# Patient Record
Sex: FEMALE | Race: WHITE | Employment: FULL TIME | URBAN - METROPOLITAN AREA
[De-identification: names, ages, dates, MRNs, and addresses within clinical notes are randomized per-mention and may not be internally consistent; named-entity substitution may affect disease eponyms.]

---

## 2017-09-01 ENCOUNTER — OFFICE VISIT (OUTPATIENT)
Dept: FAMILY MEDICINE CLINIC | Age: 32
End: 2017-09-01

## 2017-09-01 VITALS
DIASTOLIC BLOOD PRESSURE: 78 MMHG | OXYGEN SATURATION: 98 % | RESPIRATION RATE: 16 BRPM | TEMPERATURE: 98.8 F | SYSTOLIC BLOOD PRESSURE: 108 MMHG | BODY MASS INDEX: 28.02 KG/M2 | HEIGHT: 63 IN | WEIGHT: 158.13 LBS | HEART RATE: 77 BPM

## 2017-09-01 DIAGNOSIS — K62.5 RECTAL BLEEDING: Primary | ICD-10-CM

## 2017-09-01 NOTE — MR AVS SNAPSHOT
Visit Information Date & Time Provider Department Dept. Phone Encounter #  
 9/1/2017  3:30 PM [facility], [address] [phone] [id number] Upcoming Health Maintenance Date Due Pneumococcal 19-64 Medium Risk (1 of 1 - PPSV23) 10/17/2005 DTaP/Tdap/Td series (1 - Tdap) 10/17/2007 PAP AKA CERVICAL CYTOLOGY 10/17/2007 INFLUENZA AGE 9 TO ADULT 8/1/2017 Allergies as of 9/1/2017  Review Complete On: 9/1/2017 By: Ariadne Newton LPN Severity Noted Reaction Type Reactions Melon High 02/26/2016    Anaphylaxis Nsaids (Non-steroidal Anti-inflammatory Drug) High 02/26/2016    Anaphylaxis Other Medication  02/26/2016    Swelling Allergic to steroids Singulair [Montelukast]  03/28/2016   Side Effect Other (comments) Numbness in arms and face, drowsiness Current Immunizations  Reviewed on 2/26/2016 No immunizations on file. Not reviewed this visit You Were Diagnosed With   
  
 Codes Comments Rectal bleeding    -  Primary ICD-10-CM: K62.5 ICD-9-CM: 569.3 Vitals BP Pulse Temp Resp Height(growth percentile) Weight(growth percentile) 108/78 77 98.8 °F (37.1 °C) (Oral) 16 5' 3\" (1.6 m) 158 lb 2 oz (71.7 kg) LMP SpO2 BMI OB Status Smoking Status 08/11/2017 (Approximate) 98% 28.01 kg/m2 Having regular periods Former Smoker Vitals History BMI and BSA Data Body Mass Index Body Surface Area 28.01 kg/m 2 1.79 m 2 Preferred Pharmacy Pharmacy Name Phone [id number], [address] [phone] Your Updated Medication List  
  
   
This list is accurate as of: 9/1/17  4:21 PM.  Always use your most recent med list.  
  
  
  
  
 albuterol 90 mcg/actuation inhaler Commonly known as:  PROVENTIL HFA, VENTOLIN HFA, PROAIR HFA Take 2 Puffs by inhalation every four (4) hours as needed for Wheezing.  
  
 escitalopram oxalate 10 mg tablet Commonly known as:  Vallorie Primmer TAKE 1 TABLET BY MOUTH EVERY DAY  
  
 montelukast 10 mg tablet Commonly known as:  SINGULAIR Take 1 Tab by mouth daily. We Performed the Following REFERRAL TO COLON AND RECTAL SURGERY [REF17 Custom] Comments:  
 Please evaluate patient for rectal bleeding. Referral Information Referral ID Referred By Referred To  
  
 [id number] [facility] [address] [facility] [place] [address] [place], [address] Visits Status Start Date End Date 1 New Request 9/1/17 9/1/18 If your referral has a status of pending review or denied, additional information will be sent to support the outcome of this decision. Introducing \[facility]\"" & HEALTH SERVICES! Dear Russell Sun: Thank you for requesting a [employer] account. Our records indicate that you already have an active [employer] account. You can access your account anytime at [email or web address] Did you know that you can access your hospital and ER discharge instructions at any time in [employer]? You can also review all of your test results from your hospital stay or ER visit. Additional Information If you have questions, please visit the Frequently Asked Questions section of the [employer] website at [email or web address] Remember, [employer] is NOT to be used for urgent needs. For medical emergencies, dial 911. Now available from your iPhone and Android! Please provide this summary of care documentation to your next provider. If you have any questions after today's visit, please call [phone].

## 2017-09-01 NOTE — PROGRESS NOTES
1. Have you been to the ER, urgent care clinic since your last visit? Hospitalized since your last visit? No    2. Have you seen or consulted any other health care providers outside of the [address] [place] [address] since your last visit? Include any pap smears or colon screening. No    Chief Complaint   Patient presents with    Cold Symptoms     diarrhea, cough & body aches x 3    Melena     x 1 wk, does have hx with hemorrhoids     Pt states she has diarrhea, cough & body aches x 3 days. She also reports blood in stool x 1 wk. She has a hx of hemorrhoids.

## 2017-09-01 NOTE — PROGRESS NOTES
HISTORY OF PRESENT ILLNESS  Bronson Mata is a 28 y.o. female. HPI  Having body aches, chills, cough, headache, sweats  Sx x 3 days  Nausea and some diarrhea   Unknown trigger    She is having rectal bleeding with bowel movement  Has pmh of hemorrhoids however never saw specialist  Worse since having some diarrhea a month ago  Denies pain or cramping    ROS  A comprehensive review of system was obtained and negative except findings in the HPI    Visit Vitals    /78    Pulse 77    Temp 98.8 °F (37.1 °C) (Oral)    Resp 16    Ht 5' 3\" (1.6 m)    Wt 158 lb 2 oz (71.7 kg)    LMP 08/11/2017 (Approximate)    SpO2 98%    BMI 28.01 kg/m2     Physical Exam   Constitutional: She is oriented to person, place, and time. She appears well-developed and well-nourished. Neck: No JVD present. Cardiovascular: Normal rate, regular rhythm and intact distal pulses. Exam reveals no gallop and no friction rub. No murmur heard. Pulmonary/Chest: Effort normal and breath sounds normal. No respiratory distress. She has no wheezes. Musculoskeletal: She exhibits no edema. Neurological: She is alert and oriented to person, place, and time. Skin: Skin is warm. Nursing note and vitals reviewed. ASSESSMENT and PLAN  Encounter Diagnoses   Name Primary?  Rectal bleeding Yes     Orders Placed This Encounter    REFERRAL TO COLON AND RECTAL SURGERY     Given referral to colo rectal surgeon for eval  Reviewed viral syndrome management, tylenol cold/flu, push fluids    I have discussed the diagnosis with the patient and the intended plan as seen in the above orders. The patient has received an after-visit summary and questions were answered concerning future plans. Patient conveyed understanding of the plan at the time of the visit.     Lisa Wilkins, MSN, ANP  9/1/2017

## 2017-12-07 ENCOUNTER — OFFICE VISIT (OUTPATIENT)
Dept: FAMILY MEDICINE CLINIC | Age: 32
End: 2017-12-07

## 2017-12-07 VITALS
SYSTOLIC BLOOD PRESSURE: 115 MMHG | HEIGHT: 63 IN | BODY MASS INDEX: 28.76 KG/M2 | DIASTOLIC BLOOD PRESSURE: 84 MMHG | RESPIRATION RATE: 18 BRPM | OXYGEN SATURATION: 100 % | WEIGHT: 162.31 LBS | HEART RATE: 86 BPM | TEMPERATURE: 98.5 F

## 2017-12-07 DIAGNOSIS — R51.9 CHRONIC NONINTRACTABLE HEADACHE, UNSPECIFIED HEADACHE TYPE: Primary | ICD-10-CM

## 2017-12-07 DIAGNOSIS — G89.29 CHRONIC NONINTRACTABLE HEADACHE, UNSPECIFIED HEADACHE TYPE: Primary | ICD-10-CM

## 2017-12-07 DIAGNOSIS — R42 DIZZINESS: ICD-10-CM

## 2017-12-07 DIAGNOSIS — R68.89 COLD INTOLERANCE: ICD-10-CM

## 2017-12-07 DIAGNOSIS — K29.70 GASTRITIS WITHOUT BLEEDING, UNSPECIFIED CHRONICITY, UNSPECIFIED GASTRITIS TYPE: ICD-10-CM

## 2017-12-07 DIAGNOSIS — M54.2 CHRONIC NECK PAIN: ICD-10-CM

## 2017-12-07 DIAGNOSIS — G89.29 CHRONIC NECK PAIN: ICD-10-CM

## 2017-12-07 RX ORDER — OMEPRAZOLE 20 MG/1
20 CAPSULE, DELAYED RELEASE ORAL DAILY
Qty: 30 CAP | Refills: 0 | Status: SHIPPED | OUTPATIENT
Start: 2017-12-07

## 2017-12-07 RX ORDER — CYCLOBENZAPRINE HCL 10 MG
10 TABLET ORAL
Qty: 30 TAB | Refills: 1 | Status: SHIPPED | OUTPATIENT
Start: 2017-12-07

## 2017-12-07 NOTE — PROGRESS NOTES
Chief Complaint   Patient presents with   Radha Leblanc     got massage yesterday- had nauesa , dizziness, spinning, hot/cold spells     she is a 33y.o. year old female who presents for evalution. Pt has massage yesterday of back and neck. Had muscle spasm in back which is why pt went. During neck massage pt was having pain, then turned into cold sweat. Pt states then started having dizziness and nausea. Told massage therapist and was recommended pt come in today. Pt states has long stand issues with neck pain. Pt states has headaches 4-5 times weekly, normal for pt since she was a child but has been worsening. Constantly feels cold. Pt states whenever lays down will have burning sensation in stomach. At same time will also feels some skin itching or crawling sensation in extremities. Reviewed PmHx, RxHx, FmHx, SocHx, AllgHx and updated and dated in the chart. Review of Systems - negative except as listed above in the HPI    Objective:     Vitals:    12/07/17 1501   BP: 115/84   Pulse: 86   Resp: 18   Temp: 98.5 °F (36.9 °C)   TempSrc: Oral   SpO2: 100%   Weight: 162 lb 5 oz (73.6 kg)   Height: 5' 3\" (1.6 m)     Physical Examination: General appearance - alert, well appearing, and in no distress  Neck - supple, no significant adenopathy, bilateral trap tightness, generalized tenderness, movements painful   Chest - clear to auscultation, no wheezes, rales or rhonchi, symmetric air entry  Heart - normal rate, regular rhythm, normal S1, S2, no murmurs, rubs, clicks or gallops  Abdomen - soft, nontender, nondistended, no masses or organomegaly  Neurological - alert, oriented, normal speech, no focal findings or movement disorder noted, cranial nerves II through XII intact, motor and sensory grossly normal bilaterally  Neg Melrose Hallpike test     Assessment/ Plan:   Diagnoses and all orders for this visit:    1.  Chronic nonintractable headache, unspecified headache type  -     REFERRAL TO NEUROLOGY  -     CBC WITH AUTOMATED DIFF  -     TSH 3RD GENERATION  -     METABOLIC PANEL, COMPREHENSIVE  Discussed Topamax but pt would like to see specialist first.     2. Cold intolerance  -     TSH 3RD GENERATION  Unclear etiology, labs pending. 3. Dizziness  -     CBC WITH AUTOMATED DIFF  -     TSH 3RD GENERATION  -     METABOLIC PANEL, COMPREHENSIVE  Unclear etiology, labs pending. 4. Chronic neck pain  -     cyclobenzaprine (FLEXERIL) 10 mg tablet; Take 1 Tab by mouth nightly as needed for Muscle Spasm(s). New rx. Activity modification, application of heat or ice. 5. Gastritis without bleeding, unspecified chronicity, unspecified gastritis type  -     omeprazole (PRILOSEC) 20 mg capsule; Take 1 Cap by mouth daily. Take with water in AM, wait 30-60 min before eating. New rx. GERD diet, advance as tolerated. Pt voiced understanding regarding plan of care. Follow-up Disposition:  Return if symptoms worsen or fail to improve. I have discussed the diagnosis with the patient and the intended plan as seen in the above orders. The patient has received an after-visit summary and questions were answered concerning future plans.      Medication Side Effects and Warnings were discussed with patient    Araceli Blizzard, NP

## 2017-12-07 NOTE — MR AVS SNAPSHOT
Visit Information Date & Time Provider Department Dept. Phone Encounter #  
 12/7/2017  3:00 PM Katelynn Oakley NP [address] [facility] [phone] [id number] Follow-up Instructions Return if symptoms worsen or fail to improve. Upcoming Health Maintenance Date Due Pneumococcal 19-64 Medium Risk (1 of 1 - PPSV23) 10/17/2005 DTaP/Tdap/Td series (1 - Tdap) 10/17/2007 PAP AKA CERVICAL CYTOLOGY 10/17/2007 Influenza Age 5 to Adult 8/1/2017 Allergies as of 12/7/2017  Review Complete On: 9/1/2017 By: Rosalio Nguyen NP Severity Noted Reaction Type Reactions Melon High 02/26/2016    Anaphylaxis Nsaids (Non-steroidal Anti-inflammatory Drug) High 02/26/2016    Anaphylaxis Other Medication  02/26/2016    Swelling Allergic to steroids Singulair [Montelukast]  03/28/2016   Side Effect Other (comments) Numbness in arms and face, drowsiness Current Immunizations  Reviewed on 2/26/2016 No immunizations on file. Not reviewed this visit You Were Diagnosed With   
  
 Codes Comments Chronic nonintractable headache, unspecified headache type    -  Primary ICD-10-CM: R51 ICD-9-CM: 784.0 Cold intolerance     ICD-10-CM: R68.89 ICD-9-CM: 780.99 Dizziness     ICD-10-CM: T74 ICD-9-CM: 780. 4 Chronic neck pain     ICD-10-CM: M54.2, G89.29 ICD-9-CM: 723.1, 338.29 Gastritis without bleeding, unspecified chronicity, unspecified gastritis type     ICD-10-CM: K29.70 ICD-9-CM: 535.50 Vitals BP Pulse Temp Resp Height(growth percentile) Weight(growth percentile) 115/84 (BP 1 Location: Left arm, BP Patient Position: Sitting) 86 98.5 °F (36.9 °C) (Oral) 18 5' 3\" (1.6 m) 162 lb 5 oz (73.6 kg) LMP SpO2 BMI OB Status Smoking Status 11/24/2017 (Exact Date) 100% 28.75 kg/m2 Having regular periods Former Smoker Vitals History BMI and BSA Data Body Mass Index Body Surface Area  28.75 kg/m 2 1.81 m 2  
  
 Preferred Pharmacy Pharmacy Name Phone [id number], [address] [place] [phone] Your Updated Medication List  
  
   
This list is accurate as of: 12/7/17  3:32 PM.  Always use your most recent med list.  
  
  
  
  
 albuterol 90 mcg/actuation inhaler Commonly known as:  PROVENTIL HFA, VENTOLIN HFA, PROAIR HFA Take 2 Puffs by inhalation every four (4) hours as needed for Wheezing. cyclobenzaprine 10 mg tablet Commonly known as:  FLEXERIL Take 1 Tab by mouth nightly as needed for Muscle Spasm(s). escitalopram oxalate 10 mg tablet Commonly known as:  Gaston Adilene TAKE 1 TABLET BY MOUTH EVERY DAY  
  
 montelukast 10 mg tablet Commonly known as:  SINGULAIR Take 1 Tab by mouth daily. omeprazole 20 mg capsule Commonly known as:  PRILOSEC Take 1 Cap by mouth daily. Take with water in AM, wait 30-60 min before eating. Prescriptions Sent to Pharmacy Refills  
 cyclobenzaprine (FLEXERIL) 10 mg tablet 1 Sig: Take 1 Tab by mouth nightly as needed for Muscle Spasm(s). Class: Normal  
 [facility] Ph #: [phone] Route: Oral  
 omeprazole (PRILOSEC) 20 mg capsule 0 Sig: Take 1 Cap by mouth daily. Take with water in AM, wait 30-60 min before eating. Class: Normal  
 Pharmacy: [facility] [address] Ph #: [phone] Route: Oral  
  
We Performed the Following CBC WITH AUTOMATED DIFF [51152 CPT(R)] METABOLIC PANEL, COMPREHENSIVE [65972 CPT(R)] REFERRAL TO NEUROLOGY [JKY35 Custom] TSH 3RD GENERATION [95345 CPT(R)] Follow-up Instructions Return if symptoms worsen or fail to improve. Referral Information Referral ID Referred By Referred To  
  
 [id number] Jennifer ENNIS MD   
   [place] [address] [address] [facility], [address] [facility] Phone: [phone] Fax: [phone] Visits Status Start Date End Date 1 New Request 12/7/17 12/7/18 If your referral has a status of pending review or denied, additional information will be sent to support the outcome of this decision. Patient Instructions Gastroesophageal Reflux Disease (GERD): Care Instructions Your Care Instructions Gastroesophageal reflux disease (GERD) is the backward flow of stomach acid into the esophagus. The esophagus is the tube that leads from your throat to your stomach. A one-way valve prevents the stomach acid from moving up into this tube. When you have GERD, this valve does not close tightly enough. If you have mild GERD symptoms including heartburn, you may be able to control the problem with antacids or over-the-counter medicine. Changing your diet, losing weight, and making other lifestyle changes can also help reduce symptoms. Follow-up care is a key part of your treatment and safety. Be sure to make and go to all appointments, and call your doctor if you are having problems. It's also a good idea to know your test results and keep a list of the medicines you take. How can you care for yourself at home? · Take your medicines exactly as prescribed. Call your doctor if you think you are having a problem with your medicine. · Your doctor may recommend over-the-counter medicine. For mild or occasional indigestion, antacids, such as Tums, Gaviscon, Mylanta, or Maalox, may help. Your doctor also may recommend over-the-counter acid reducers, such as Pepcid AC, Tagamet HB, Zantac 75, or Prilosec. Read and follow all instructions on the label. If you use these medicines often, talk with your doctor. · Change your eating habits. ¨ It's best to eat several small meals instead of two or three large meals. ¨ After you eat, wait 2 to 3 hours before you lie down. ¨ Chocolate, mint, and alcohol can make GERD worse. ¨ Spicy foods, foods that have a lot of acid (like tomatoes and oranges), and coffee can make GERD symptoms worse in some people. If your symptoms are worse after you eat a certain food, you may want to stop eating that food to see if your symptoms get better. · Do not smoke or chew tobacco. Smoking can make GERD worse. If you need help quitting, talk to your doctor about stop-smoking programs and medicines. These can increase your chances of quitting for good. · If you have GERD symptoms at night, raise the head of your bed 6 to 8 inches by putting the frame on blocks or placing a foam wedge under the head of your mattress. (Adding extra pillows does not work.) · Do not wear tight clothing around your middle. · Lose weight if you need to. Losing just 5 to 10 pounds can help. When should you call for help? Call your doctor now or seek immediate medical care if: 
? · You have new or different belly pain. ? · Your stools are black and tarlike or have streaks of blood. ? Watch closely for changes in your health, and be sure to contact your doctor if: 
? · Your symptoms have not improved after 2 days. ? · Food seems to catch in your throat or chest.  
Where can you learn more? Go to http://judith-kenny.info/. Enter Z482 in the search box to learn more about \"Gastroesophageal Reflux Disease (GERD): Care Instructions. \" Current as of: May 12, 2017 Content Version: 11.4 © 5260-6104 [employer]. Care instructions adapted under license by [employer] (which disclaims liability or warranty for this information). If you have questions about a medical condition or this instruction, always ask your healthcare professional. [place] [address] any warranty or liability for your use of this information. Introducing [facility] & HEALTH SERVICES! Dear Darien Show: Thank you for requesting a [employer] account.   Our records indicate that you already have an active [employer] account. You can access your account anytime at [email or web address] Did you know that you can access your hospital and ER discharge instructions at any time in [employer]? You can also review all of your test results from your hospital stay or ER visit. Additional Information If you have questions, please visit the Frequently Asked Questions section of the [employer] website at [email or web address] Remember, [employer] is NOT to be used for urgent needs. For medical emergencies, dial 911. Now available from your iPhone and Android! Please provide this summary of care documentation to your next provider. If you have any questions after today's visit, please call [phone].

## 2017-12-07 NOTE — PROGRESS NOTES
1. Have you been to the ER, urgent care clinic since your last visit? Hospitalized since your last visit? No    2. Have you seen or consulted any other health care providers outside of the [address] [place] [address] since your last visit? Include any pap smears or colon screening.  No   Chief Complaint   Patient presents with   Tia Skeeters     got massage yesterday- had nauesa , dizziness, spinning, hot/cold spells

## 2017-12-07 NOTE — PATIENT INSTRUCTIONS

## 2017-12-08 ENCOUNTER — OFFICE VISIT (OUTPATIENT)
Dept: NEUROLOGY | Age: 32
End: 2017-12-08

## 2017-12-08 VITALS
HEART RATE: 90 BPM | SYSTOLIC BLOOD PRESSURE: 108 MMHG | WEIGHT: 164.2 LBS | BODY MASS INDEX: 29.09 KG/M2 | OXYGEN SATURATION: 98 % | DIASTOLIC BLOOD PRESSURE: 78 MMHG

## 2017-12-08 DIAGNOSIS — M54.2 NECK PAIN: ICD-10-CM

## 2017-12-08 DIAGNOSIS — G43.101 MIGRAINE WITH AURA AND WITH STATUS MIGRAINOSUS, NOT INTRACTABLE: Primary | ICD-10-CM

## 2017-12-08 LAB
ALBUMIN SERPL-MCNC: 4.4 G/DL (ref 3.5–5.5)
ALBUMIN/GLOB SERPL: 1.8 {RATIO} (ref 1.2–2.2)
ALP SERPL-CCNC: 101 IU/L (ref 39–117)
ALT SERPL-CCNC: 21 IU/L (ref 0–32)
AST SERPL-CCNC: 13 IU/L (ref 0–40)
BASOPHILS # BLD AUTO: 0 X10E3/UL (ref 0–0.2)
BASOPHILS NFR BLD AUTO: 0 %
BILIRUB SERPL-MCNC: 0.3 MG/DL (ref 0–1.2)
BUN SERPL-MCNC: 13 MG/DL (ref 6–20)
BUN/CREAT SERPL: 21 (ref 9–23)
CALCIUM SERPL-MCNC: 9.4 MG/DL (ref 8.7–10.2)
CHLORIDE SERPL-SCNC: 100 MMOL/L (ref 96–106)
CO2 SERPL-SCNC: 25 MMOL/L (ref 18–29)
CREAT SERPL-MCNC: 0.63 MG/DL (ref 0.57–1)
EOSINOPHIL # BLD AUTO: 0.3 X10E3/UL (ref 0–0.4)
EOSINOPHIL NFR BLD AUTO: 4 %
ERYTHROCYTE [DISTWIDTH] IN BLOOD BY AUTOMATED COUNT: 15.7 % (ref 12.3–15.4)
GFR SERPLBLD CREATININE-BSD FMLA CKD-EPI: 120 ML/MIN/1.73
GFR SERPLBLD CREATININE-BSD FMLA CKD-EPI: 138 ML/MIN/1.73
GLOBULIN SER CALC-MCNC: 2.5 G/DL (ref 1.5–4.5)
GLUCOSE SERPL-MCNC: 108 MG/DL (ref 65–99)
HCT VFR BLD AUTO: 39.6 % (ref 34–46.6)
HGB BLD-MCNC: 13.3 G/DL (ref 11.1–15.9)
IMM GRANULOCYTES # BLD: 0 X10E3/UL (ref 0–0.1)
IMM GRANULOCYTES NFR BLD: 0 %
LYMPHOCYTES # BLD AUTO: 2.3 X10E3/UL (ref 0.7–3.1)
LYMPHOCYTES NFR BLD AUTO: 30 %
MCH RBC QN AUTO: 27.7 PG (ref 26.6–33)
MCHC RBC AUTO-ENTMCNC: 33.6 G/DL (ref 31.5–35.7)
MCV RBC AUTO: 82 FL (ref 79–97)
MONOCYTES # BLD AUTO: 0.6 X10E3/UL (ref 0.1–0.9)
MONOCYTES NFR BLD AUTO: 8 %
NEUTROPHILS # BLD AUTO: 4.5 X10E3/UL (ref 1.4–7)
NEUTROPHILS NFR BLD AUTO: 58 %
PLATELET # BLD AUTO: 268 X10E3/UL (ref 150–379)
POTASSIUM SERPL-SCNC: 4 MMOL/L (ref 3.5–5.2)
PROT SERPL-MCNC: 6.9 G/DL (ref 6–8.5)
RBC # BLD AUTO: 4.81 X10E6/UL (ref 3.77–5.28)
SODIUM SERPL-SCNC: 141 MMOL/L (ref 134–144)
TSH SERPL DL<=0.005 MIU/L-ACNC: 2.35 UIU/ML (ref 0.45–4.5)
WBC # BLD AUTO: 7.7 X10E3/UL (ref 3.4–10.8)

## 2017-12-08 RX ORDER — NORTRIPTYLINE HYDROCHLORIDE 10 MG/1
10 CAPSULE ORAL
Qty: 30 CAP | Refills: 5 | Status: SHIPPED | OUTPATIENT
Start: 2017-12-08

## 2017-12-08 RX ORDER — SUMATRIPTAN 50 MG/1
TABLET, FILM COATED ORAL
Qty: 12 TAB | Refills: 3 | Status: SHIPPED | OUTPATIENT
Start: 2017-12-08

## 2017-12-08 NOTE — PATIENT INSTRUCTIONS
[facility]   Statement to Patients  April 1, 2014      In an effort to ensure the large volume of patient prescription refills is processed in the most efficient and expeditious manner, we are asking our patients to assist us by calling your Pharmacy for all prescription refills, this will include also your  Mail Order Pharmacy. The pharmacy will contact our office electronically to continue the refill process. Please do not wait until the last minute to call your pharmacy. We need at least 48 hours (2days) to fill prescriptions. We also encourage you to call your pharmacy before going to  your prescription to make sure it is ready. With regard to controlled substance prescription refill requests (narcotic refills) that need to be picked up at our office, we ask your cooperation by providing us with at least 72 hours (3days) notice that you will need a refill. We will not refill narcotic prescription refill requests after 4:00pm on any weekday, Monday through Thursday, or after 2:00pm on Fridays, or on the weekends. We encourage everyone to explore another way of getting your prescription refill request processed using [employer], our patient web portal through our electronic medical record system. [employer] is an efficient and effective way to communicate your medication request directly to the office and  downloadable as an parminder on your smart phone . [employer] also features a review functionality that allows you to view your medication list as well as leave messages for your physician. Are you ready to get connected? If so please review the attatched instructions or speak to any of our staff to get you set up right away! Thank you so much for your cooperation. Should you have any questions please contact our Practice Administrator.     The Physicians and Staff,  [facility]

## 2017-12-08 NOTE — PROGRESS NOTES
NEUROLOGY NEW PATIENT OFFICE CONSULTATION      12/8/2017    RE: José Luis Almazan         1985      REFERRED BY:  Queta Rojo NP        CHIEF COMPLAINT:  This is José Luis Almazan is a 33 y.o. female right handed  who had concerns including Headache; Tingling; and Neck Pain. HPI:     Last Dec 6, 2017 patient had a neck massage c/o  chiropractor. Was getting manipulation of the upper neck, after which, patient noted dizziness described as being of balanced lasting for 2-3 hrs  with heaviness of both arms, R worse than L. Had similar episodes every massage. Just started Flexeril    (+) headaches since 10 yo described as 4/ month, 6/10 bifrontal, R temple, sharp stabbing, lasting 5-6 hrs, usually in the afternoon, bright light triggers and salty, caffeine helps, (+) nausea (-) vomiting (+) photophobia (+) phonophobia (+) blurred vision    Currently feels better.     4 cups of coffee/ day    Neck X-ray in the past showed \"arthritis\"    ROS  All other systems reviewed and are negative  (-) fever  (-) chills    Past Medical Hx  Past Medical History:   Diagnosis Date    Depression    Post partum depression  Asthma  Concussion - childhood  Neck injury during childhood delivery 2014  Chronic neck and lower back pain 4/10 intensity since teen age yrs;     Social Hx  Social History     Social History    Marital status:      Spouse name: N/A    Number of children: N/A    Years of education: N/A     Social History Main Topics    Smoking status: Former Smoker    Smokeless tobacco: Never Used    Alcohol use No    Drug use: No    Sexual activity: Yes     Partners: Male      Comment:  has vasectomy     Other Topics Concern    None     Social History Narrative       Family Hx  Family History   Problem Relation Age of Onset    Cancer Father     Diabetes Father     Hypertension Father     Cancer Maternal Grandmother    Fibromyalgia and rheumatoid arthritis - mother and older sister    ALLERGIES  Allergies   Allergen Reactions    Melon Anaphylaxis    Nsaids (Non-Steroidal Anti-Inflammatory Drug) Anaphylaxis    Other Medication Swelling     Allergic to steroids       Singulair [Montelukast] Other (comments)     Numbness in arms and face, drowsiness       CURRENT MEDS  Current Outpatient Prescriptions   Medication Sig Dispense Refill    nortriptyline (PAMELOR) 10 mg capsule Take 1 Cap by mouth nightly. 30 Cap 5    SUMAtriptan (IMITREX) 50 mg tablet Take 1 tab at onset of headache. May take another 1 tab after 2 hrs. Max 2 tabs / 24 hrs 12 Tab 3    cyclobenzaprine (FLEXERIL) 10 mg tablet Take 1 Tab by mouth nightly as needed for Muscle Spasm(s). 30 Tab 1    omeprazole (PRILOSEC) 20 mg capsule Take 1 Cap by mouth daily. Take with water in AM, wait 30-60 min before eating. 30 Cap 0    montelukast (SINGULAIR) 10 mg tablet Take 1 Tab by mouth daily. 30 Tab 5    albuterol (PROVENTIL HFA, VENTOLIN HFA, PROAIR HFA) 90 mcg/actuation inhaler Take 2 Puffs by inhalation every four (4) hours as needed for Wheezing. 1 Inhaler 1           PREVIOUS WORKUP: (reviewed)  IMAGING:    CT Results (recent):  No results found for this or any previous visit. MRI Results (recent):  No results found for this or any previous visit. IR Results (recent):  No results found for this or any previous visit. VAS/US Results (recent):  No results found for this or any previous visit.         LABS (reviewed)  Results for orders placed or performed during the hospital encounter of 03/08/09   NORBERTO, OTHER   Result Value Ref Range    Specimen Description: CERVIX     Special Requests: NO SPECIAL REQUESTS     KOH NO YEAST SEEN     Report Status 03/08/2009 FINAL    CHLAMYDIA/GC PROBE   Result Value Ref Range    Site CERVIX     C. trachomatis - DNA probe NEGATIVE  NEGATIVE    N. gonorrhoeae - DNA probe NEGATIVE  NEGATIVE   CBC W/ AUTOMATED DIFF   Result Value Ref Range    WBC 7.5 3.6 - 11.0 K/uL    RBC 4.63 3.80 - 5.20 M/uL    HGB 13.5 11.5 - 16.0 g/dL    HCT 40.3 35.0 - 47.0 %    MCV 87.0 80.0 - 99.0 FL    MCH 29.2 26.0 - 34.0 PG    MCHC 33.5 30.0 - 35.0 g/dL    RDW 13.3 11.5 - 14.5 %    PLATELET 306 935 - 422 K/uL    NEUTROPHILS 62 32 - 75 %    LYMPHOCYTES 30 12 - 49 %    MONOCYTES 6 5 - 13 %    EOSINOPHILS 2 0 - 7 %    BASOPHILS 0 0 - 1 %    ABS. NEUTROPHILS 4.7 1.8 - 8.0 K/UL    ABS. LYMPHOCYTES 2.2 0.8 - 3.5 K/UL    ABS. MONOCYTES 0.4 0 - 1.0 K/UL    ABS. EOSINOPHILS 0.1 0.0 - 0.4 K/UL    ABS. BASOPHILS 0.0 0.0 - 0.1 K/UL   METABOLIC PANEL, COMPREHENSIVE   Result Value Ref Range    Sodium 143 136 - 145 MMOL/L    Potassium 3.7 3.5 - 5.1 MMOL/L    Chloride 106 97 - 108 MMOL/L    CO2 24 21 - 32 MMOL/L    Anion gap 13 5 - 15      Glucose 89 65 - 105 MG/DL    BUN 8 6 - 20 MG/DL    Creatinine 0.6 0.6 - 1.3 MG/DL    BUN/Creatinine ratio 13 12 - 20      GFR est AA >60 >60 ml/min/1.73m2    GFR est non-AA >60 >60 ml/min/1.73m2    Calcium 9.3 8.5 - 10.1 MG/DL    Bilirubin, total 0.6 0 - 1.0 MG/DL    ALT (SGPT) 29 (L) 30 - 65 U/L    AST (SGOT) 13 (L) 15 - 37 U/L    Alk.  phosphatase 94 50 - 136 U/L    Protein, total 7.7 6.4 - 8.2 g/dL    Albumin 3.9 3.5 - 5.0 g/dL    Globulin 3.8 2.0 - 4.0 g/dL    A-G Ratio 1.0 (L) 1.1 - 2.2     HCG,SERUM QUANT   Result Value Ref Range    Beta HCG,  (H) 0 - 6 MIU/ML   URINE W/CULTURE IF I   Result Value Ref Range    Color YELLOW     Appearance CLEAR     Specific gravity 1.019 1.003 - 1.030      pH (UA) 6.0 5.0 - 8.0      Protein NEGATIVE  NEGATIVE MG/DL    Glucose NEGATIVE  NEGATIVE MG/DL    Ketone NEGATIVE  NEGATIVE MG/DL    Bilirubin NEGATIVE  NEGATIVE    Blood MODERATE (A) NEGATIVE    Urobilinogen 1.0 0.2 - 1.0 EU/DL    Nitrites NEGATIVE  NEGATIVE    Leukocyte Esterase NEGATIVE  NEGATIVE    UA:UC IF INDICATED CULTURE NOT INDICATED BY UA RESULT     WBC 0-4 0 - 4 /HPF    RBC 20-50 0 - 5 /HPF    Epithelial cells 0-5 0 - 5 /LPF    Bacteria NEGATIVE  NEGATIVE /HPF    Hyaline cast 0-2 0 - 2   WET PREP   Result Value Ref Range    Clue cells CLUE CELLS ABSENT     Wet prep NO TRICHOMONAS SEEN    TYPE & RH   Result Value Ref Range    ABO/Rh(D) O POS     Comment SAMPLE NOT USABLE FOR CROSSMATCH        Physical Exam:     Visit Vitals    /78    Pulse 90    Wt 74.5 kg (164 lb 3.2 oz)    LMP 11/24/2017 (Exact Date)    SpO2 98%    BMI 29.09 kg/m2     General:  Alert, cooperative, no distress. Head:  Normocephalic, without obvious abnormality, atraumatic. Eyes:  Conjunctivae/corneas clear. Lungs:  Heart:   Non labored breathing  Regular rate and rhythm, no carotid bruits   Abdomen:   Soft, non-distended   Extremities: Extremities normal, atraumatic, no cyanosis or edema. Pulses: 2+ and symmetric all extremities. Skin: Skin color, texture, turgor normal. No rashes or lesions. Neurologic Exam     Gen: Attention normal             Language: naming, repetition, fluency normal             Memory: intact recent and remote memory  Cranial Nerves:  I: smell Not tested   II: visual fields Full to confrontation   II: pupils Equal, round, reactive to light   II: optic disc No papilledema   III,VII: ptosis none   III,IV,VI: extraocular muscles  Full ROM   V: mastication normal   V: facial light touch sensation  normal   VII: facial muscle function   symmetric   VIII: hearing symmetric   IX: soft palate elevation  normal   XI: trapezius strength  5/5   XI: sternocleidomastoid strength 5/5   XI: neck flexion strength  5/5   XII: tongue  midline     Motor: normal bulk and tone, no tremor              Strength: 5/5 all four extremities  (+) tenderness neck and middle back area  Sensory: intact to LT, PP, vibration, and JPS  Reflexes: 2+ throughout; Down going toes  Coordination: Good FTN and HTS  Gait: normal gait including tandem            Impression:     Arias Davalos is a 33 y.o. female who  has a past medical history of Depression. who comes in with several concerns.  Patient has chronic neck and lower back pain 4/10 intensity since teen age yrs and should be evaluated for rheumatologic condition (mother and sister had rheumatoid arthritis and fibromyalgia). Last Dec 6, 2017 patient had chiropractic manipulation of the upper neck, after which, noted dizziness described as being off balanced lasting for 2-3 hrs  with heaviness of both arms, R worse than L. Patient should be evaluated for trauma of the neck (including dissection). Lastly, patient has headaches since 10 yo described as 4/ month, 6/10 bifrontal, R temple, sharp stabbing, lasting 5-6 hrs, usually in the afternoon, bright light triggers and salty, caffeine helps, associated with nausea, photophobia,  phonophobia and blurred vision consistent with migraine with visual auras. Patient should be evaluated for structural causes of her symptoms. Patient may also has superimposed caffeine headache. (4-5 cups coffee/ day)    RECOMMENDATIONS  1. MRI brain looking for stroke. If positive, will do MRA brain and neck looking for dissection  2. MRI cervical spine looking for bulging disc that can explain her arm symptoms and chronic neck pain  3. Blood test for RF, BARBIE, ESR  4. Trial of Nortriptyline 10 mg QHS as migraine prophylaxis and can help with mood, sleep and chronic neck and lower back pain  5. Given samples of Zomig nasal spray and prescribed Imitrex 50 mg to try to abort headaches  6. Discussed the need for headache diary and went through the list that can trigger headache (caffeine, stress, bright light, salt)  7. Advise to switch to decaf coffee    Ms. Jeison Diallo has a reminder for a \"due or due soon\" health maintenance. I have asked that she contact her primary care provider for follow-up on this health maintenance. Follow-up Disposition:  Return in about 6 weeks (around 1/19/2018).         Thank you for the consultation      Sandra Ramsay MD  Diplomate, American Board of Psychiatry and Neurology  Diplomate, Neuromuscular Medicine  [facility] Board of Electrodiagnostic Medicine    Greater than 50% of time spent counseling patient      CC: Rosalino Varghese NP  Fax: [phone]

## 2017-12-08 NOTE — MR AVS SNAPSHOT
Visit Information Date & Time Provider Department Dept. Phone Encounter #  
 12/8/2017  8:00 AM [facility] [phone] [id number] Follow-up Instructions Return in about 6 weeks (around 1/19/2018). Upcoming Health Maintenance Date Due Pneumococcal 19-64 Medium Risk (1 of 1 - PPSV23) 10/17/2005 DTaP/Tdap/Td series (1 - Tdap) 10/17/2007 PAP AKA CERVICAL CYTOLOGY 10/17/2007 Influenza Age 5 to Adult 8/1/2017 Allergies as of 12/8/2017  Review Complete On: 12/8/2017 By: Greg Mueller MD  
  
 Severity Noted Reaction Type Reactions Melon High 02/26/2016    Anaphylaxis Nsaids (Non-steroidal Anti-inflammatory Drug) High 02/26/2016    Anaphylaxis Other Medication  02/26/2016    Swelling Allergic to steroids Singulair [Montelukast]  03/28/2016   Side Effect Other (comments) Numbness in arms and face, drowsiness Current Immunizations  Reviewed on 2/26/2016 No immunizations on file. Not reviewed this visit You Were Diagnosed With   
  
 Codes Comments Migraine with aura and with status migrainosus, not intractable    -  Primary ICD-10-CM: G43. [place] ICD-9-CM: 346.03 Neck pain     ICD-10-CM: M54.2 ICD-9-CM: 723.1 Vitals BP Pulse Weight(growth percentile) LMP SpO2 BMI  
 108/78 90 164 lb 3.2 oz (74.5 kg) 11/24/2017 (Exact Date) 98% 29.09 kg/m2 OB Status Smoking Status Having regular periods Former Smoker BMI and BSA Data Body Mass Index Body Surface Area  
 29.09 kg/m 2 1.82 m 2 Preferred Pharmacy Pharmacy Name Phone [id number] [address] [phone] Your Updated Medication List  
  
   
This list is accurate as of: 12/8/17  9:00 AM.  Always use your most recent med list.  
  
  
  
  
 albuterol 90 mcg/actuation inhaler Commonly known as:  PROVENTIL HFA, VENTOLIN HFA, PROAIR HFA  
 Take 2 Puffs by inhalation every four (4) hours as needed for Wheezing. cyclobenzaprine 10 mg tablet Commonly known as:  FLEXERIL Take 1 Tab by mouth nightly as needed for Muscle Spasm(s). montelukast 10 mg tablet Commonly known as:  SINGULAIR Take 1 Tab by mouth daily. nortriptyline 10 mg capsule Commonly known as:  PAMELOR Take 1 Cap by mouth nightly. omeprazole 20 mg capsule Commonly known as:  PRILOSEC Take 1 Cap by mouth daily. Take with water in AM, wait 30-60 min before eating. SUMAtriptan 50 mg tablet Commonly known as:  IMITREX Take 1 tab at onset of headache. May take another 1 tab after 2 hrs. Max 2 tabs / 24 hrs Prescriptions Sent to Pharmacy Refills  
 nortriptyline (PAMELOR) 10 mg capsule 5 Sig: Take 1 Cap by mouth nightly. Class: Normal  
 [facility]  [address] [facility] [address] Ph #: [phone] Route: Oral  
 SUMAtriptan (IMITREX) 50 mg tablet 3 Sig: Take 1 tab at onset of headache. May take another 1 tab after 2 hrs. Max 2 tabs / 24 hrs Class: Normal  
 [facility] [place] [address] [facility] Ph #: [phone] We Performed the Following BARBIE, DIRECT, W/REFLEX [id number] CPT(R)] Job Staggers [[id number] Custom] SED RATE (ESR) [id number] CPT(R)] Follow-up Instructions Return in about 6 weeks (around 1/19/2018). To-Do List   
 12/08/2017 Imaging:  MRI BRAIN WO CONT   
  
 12/08/2017 Imaging:  MRI CERV SPINE WO CONT Patient Instructions PRESCRIPTION REFILL POLICY [facility] Statement to Patients April 1, 2014 In an effort to ensure the large volume of patient prescription refills is processed in the most efficient and expeditious manner, we are asking our patients to assist us by calling your Pharmacy for all prescription refills, this will include also your  Mail Order Pharmacy. The pharmacy will contact our office electronically to continue the refill process. Please do not wait until the last minute to call your pharmacy. We need at least 48 hours (2days) to fill prescriptions. We also encourage you to call your pharmacy before going to  your prescription to make sure it is ready. With regard to controlled substance prescription refill requests (narcotic refills) that need to be picked up at our office, we ask your cooperation by providing us with at least 72 hours (3days) notice that you will need a refill. We will not refill narcotic prescription refill requests after 4:00pm on any weekday, Monday through Thursday, or after 2:00pm on Fridays, or on the weekends. We encourage everyone to explore another way of getting your prescription refill request processed using Dat[employer], our patient web portal through our electronic medical record system. Datto is an efficient and effective way to communicate your medication request directly to the office and  downloadable as an parminder on your smart phone . Datto also features a review functionality that allows you to view your medication list as well as leave messages for your physician. Are you ready to get connected? If so please review the attatched instructions or speak to any of our staff to get you set up right away! Thank you so much for your cooperation. Should you have any questions please contact our Practice Administrator. The Physicians and Staff,  [facility]! Dear Jaziel Mullen: Thank you for requesting a Datto account. Our records indicate that you already have an active Datto account. You can access your account anytime at [email or web address] Did you know that you can access your hospital and ER discharge instructions at any time in [employer]ver[employer]? You can also review all of your test results from your hospital stay or ER visit. Additional Information If you have questions, please visit the Frequently Asked Questions section of the [employer] website at [email or web address] Remember, [employer] is NOT to be used for urgent needs. For medical emergencies, dial 911. Now available from your iPhone and Android! Please provide this summary of care documentation to your next provider. Your primary care clinician is listed as Fabiola Salmeron. If you have any questions after today's visit, please call [phone].

## 2017-12-08 NOTE — LETTER
12/8/2017 Ms. Rodriguez Kiser [address] [facility] [address] [facility] [address] To Whom It May Concern: 
 
Rodriguez Kiser is under the care of [facility]. Please accommodate the removal/ reduction of the florescent lighting in and near work space. If there are questions or concerns please have the patient contact our office. Sincerely, Mindy Mejia MD

## 2017-12-14 LAB
14.3.3 ETA, RHEUM. ARTHRITIS: <0.2 NG/ML
ANA SER QL: NEGATIVE
CCP IGA+IGG SERPL IA-ACNC: <20 UNITS
ERYTHROCYTE [SEDIMENTATION RATE] IN BLOOD BY WESTERGREN METHOD: 4 MM/HR (ref 0–32)
RHEUMATOID FACT SERPL-ACNC: <14 UNITS/ML

## 2017-12-18 ENCOUNTER — HOSPITAL ENCOUNTER (OUTPATIENT)
Dept: MRI IMAGING | Age: 32
Discharge: HOME | End: 2017-12-18
Attending: PSYCHIATRY & NEUROLOGY
Payer: COMMERCIAL

## 2017-12-18 DIAGNOSIS — M54.2 NECK PAIN: ICD-10-CM

## 2017-12-18 DIAGNOSIS — G43.101 MIGRAINE WITH AURA AND WITH STATUS MIGRAINOSUS, NOT INTRACTABLE: ICD-10-CM

## 2017-12-18 PROCEDURE — 72141 MRI NECK SPINE W/O DYE: CPT

## 2017-12-18 PROCEDURE — 70551 MRI BRAIN STEM W/O DYE: CPT

## 2017-12-20 ENCOUNTER — TELEPHONE (OUTPATIENT)
Dept: NEUROLOGY | Age: 32
End: 2017-12-20

## 2017-12-20 NOTE — TELEPHONE ENCOUNTER
Called and spoke to patient relayed per MD  Pls inform patient the good news that blood work, MRI brain and MRI cervical spine are all normal.   Patient states understanding

## 2017-12-20 NOTE — TELEPHONE ENCOUNTER
----- Message from Evangelina Oreilly sent at 12/20/2017  9:54 AM EST -----  Regarding: /Telephone   Pt request MRI results. Best contact number is [phone].

## 2018-01-19 ENCOUNTER — OFFICE VISIT (OUTPATIENT)
Dept: NEUROLOGY | Age: 33
End: 2018-01-19

## 2018-01-19 VITALS
OXYGEN SATURATION: 99 % | WEIGHT: 164 LBS | HEART RATE: 75 BPM | BODY MASS INDEX: 29.05 KG/M2 | SYSTOLIC BLOOD PRESSURE: 120 MMHG | DIASTOLIC BLOOD PRESSURE: 70 MMHG

## 2018-01-19 DIAGNOSIS — G43.101 MIGRAINE WITH AURA AND WITH STATUS MIGRAINOSUS, NOT INTRACTABLE: Primary | ICD-10-CM

## 2018-01-19 NOTE — PATIENT INSTRUCTIONS
[facility]   Statement to Patients  April 1, 2014      In an effort to ensure the large volume of patient prescription refills is processed in the most efficient and expeditious manner, we are asking our patients to assist us by calling your Pharmacy for all prescription refills, this will include also your  Mail Order Pharmacy. The pharmacy will contact our office electronically to continue the refill process. Please do not wait until the last minute to call your pharmacy. We need at least 48 hours (2days) to fill prescriptions. We also encourage you to call your pharmacy before going to  your prescription to make sure it is ready. With regard to controlled substance prescription refill requests (narcotic refills) that need to be picked up at our office, we ask your cooperation by providing us with at least 72 hours (3days) notice that you will need a refill. We will not refill narcotic prescription refill requests after 4:00pm on any weekday, Monday through Thursday, or after 2:00pm on Fridays, or on the weekends. We encourage everyone to explore another way of getting your prescription refill request processed using [employer], our patient web portal through our electronic medical record system. [employer] is an efficient and effective way to communicate your medication request directly to the office and  downloadable as an parminder on your smart phone . [employer] also features a review functionality that allows you to view your medication list as well as leave messages for your physician. Are you ready to get connected? If so please review the attatched instructions or speak to any of our staff to get you set up right away! Thank you so much for your cooperation. Should you have any questions please contact our Practice Administrator.     The Physicians and Staff,  [facility]

## 2018-01-19 NOTE — PROGRESS NOTES
Neurology Progress Note    Patient ID:  Kaylie Cartwright  [id number]  22 y.o.  1985      Subjective:   History:  Kaylie Cartwright is a 33 y.o. female who  has a past medical history of Depression. who comes in with several concerns. Patient has chronic neck and lower back pain 4/10 intensity since teen age yrs and should be evaluated for rheumatologic condition (mother and sister had rheumatoid arthritis and fibromyalgia). Last Dec 6, 2017 patient had chiropractic manipulation of the upper neck, after which, noted dizziness described as being off balanced lasting for 2-3 hrs  with heaviness of both arms, R worse than L. Patient should be evaluated for trauma of the neck (including dissection). Lastly, patient has headaches since 8 yo described as 4/ month, 6/10 bifrontal, R temple, sharp stabbing, lasting 5-6 hrs, usually in the afternoon, bright light triggers and salty, caffeine helps, associated with nausea, photophobia,  phonophobia and blurred vision consistent with migraine with visual auras. Patient should be evaluated for structural causes of her symptoms. Patient may also has superimposed caffeine headache. (4-5 cups coffee/ day)     Since the las time, patient has cut back on coffee and her job fixed the light with decrease headache. Imitrex 50 mg worked, but caused paresthesia of the jaw. Has not gone to chiropractor since. Only tried Nortriptyline for 5 days but got sick and was unable to continue it.     ROS:  Per HPI-  Otherwise the remainder of ROS was negative    Social Hx  Social History     Social History    Marital status:      Spouse name: N/A    Number of children: N/A    Years of education: N/A     Social History Main Topics    Smoking status: Former Smoker    Smokeless tobacco: Never Used    Alcohol use No    Drug use: No    Sexual activity: Yes     Partners: Male      Comment:  has vasectomy     Other Topics Concern    None     Social History Narrative Meds:  Current Outpatient Prescriptions on File Prior to Visit   Medication Sig Dispense Refill    albuterol (PROVENTIL HFA, VENTOLIN HFA, PROAIR HFA) 90 mcg/actuation inhaler Take 2 Puffs by inhalation every four (4) hours as needed for Wheezing. 1 Inhaler 1    nortriptyline (PAMELOR) 10 mg capsule Take 1 Cap by mouth nightly. 30 Cap 5    SUMAtriptan (IMITREX) 50 mg tablet Take 1 tab at onset of headache. May take another 1 tab after 2 hrs. Max 2 tabs / 24 hrs 12 Tab 3    cyclobenzaprine (FLEXERIL) 10 mg tablet Take 1 Tab by mouth nightly as needed for Muscle Spasm(s). 30 Tab 1    omeprazole (PRILOSEC) 20 mg capsule Take 1 Cap by mouth daily. Take with water in AM, wait 30-60 min before eating. 30 Cap 0    montelukast (SINGULAIR) 10 mg tablet Take 1 Tab by mouth daily. 30 Tab 5     No current facility-administered medications on file prior to visit. Imaging:    CT Results (recent):  No results found for this or any previous visit. MRI Results (recent):    Results from [place] encounter on 12/18/17   MRI CERV SPINE WO CONT   Narrative EXAM:  MRI CERV SPINE WO CONT    INDICATION:  Neck pain, chronic, abn neuro exam, xray spondylosis; Had  chiropractic manipulation with dizziness and numbness of both hands. Migraine  with aura, not intractable, with status migrainosus    COMPARISON: None    TECHNIQUE: MR imaging of the cervical spine was performed using the following  sequences: sagittal T1, T2, STIR;  axial T2, T1.     CONTRAST:  None. FINDINGS:    There is normal alignment of the cervical spine, with straightening of the usual  cervical lordosis. Vertebral body heights are maintained. Marrow signal is  normal.    The craniocervical junction is intact. The course, caliber, and signal  intensity of the spinal cord are normal.      The paraspinal soft tissues are within normal limits. C2-C3:  No herniation or stenosis. C3-C4:  No herniation or stenosis.     C4-C5:  No herniation or stenosis. C5-C6:  No herniation or stenosis. Minimal bulging disc. C6-C7:  No herniation or stenosis. Minimal bulging disc. C7-T1:  No herniation or stenosis. Impression IMPRESSION:  Minimal bulging discs at C5-6 and C6-7. Otherwise negative MRI of the cervical  spine. No spinal stenosis or cord signal abnormality. IR Results (recent):  No results found for this or any previous visit. VAS/US Results (recent):  No results found for this or any previous visit. Reviewed records in [employer] today    Lab Review     Office Visit on 12/08/2017   Component Date Value Ref Range Status    Sed rate (ESR) 12/08/2017 4  0 - 32 mm/hr Final    Rheumatoid Arthritis Factor 12/08/2017 <14.0  Units/mL Final    Comment: Reference Range:  <14.0          Negative  > or = 14.0    Positive      CCP Antibodies IgG/IgA 12/08/2017 <20  Units Final    Comment: Reference Range:  < 20        Negative  20 - 39     Weak Positive  40 - 59     Moderate Positive  > or = 60   Strong Positive      14. 3.3 ETA, Rheum. Arthritis 12/08/2017 <0.20  ng/mL Final    Comment: Reference Range:  < 0.2 ng/mL  COMMENTS:  - This RheumAssure panel is comprised of three tests: RA  Factor, CCP Antibodies, and 14-3-3 eta protein. - Used together, these three markers are able to diagnose  established RA with a sensitivity of 88-96% and early RA  with a sensitivity of 78-92%(1,2).  - RA factor (also called RF) is elevated in established RA  (sensitivity 72-85%)(3,2) and in early RA (sensitivity  44-63%)(4,1). Its specificity for RA is 80%(3). - CCP Antibodies have similar sensitivities for  established RA (66-79%) and early RA (59%)(3,2) but  higher specificity (90-96%)(3,5). - Serum 14-3-3 eta protein is elevated in both established  RA (sensitivity 77%) and early RA (sensitivity 59-64%)  (1,2). It may provide a 15-20% incremental benefit in  identifying early RA (4,1).   - Therefore, elevation of one or more markers of  RheumAssure is consistent with a diagnosis of rheumatoid  arthritis. When all three markers are negative, an RA  diagnosis is less likely. Refere                           nces:  1. Juan GOVEA et al.J Rheumatol 2015;42(9):4821-4284.  2. Juan GOVEA et al. Tamika Lillie Rheumatol 2719;43(10):8-17. 3. Dakotah HUNG et al. [facility] Rheum Dis 2003;62:870-874.  4. Kane N, et al. Arthritis Res Ther 2016;18:37.  5. Josefina S et al. [facility] Rheum Dis 3959;80:712-153.  Antinuclear Antibodies Direct 12/08/2017 Negative  Negative Final   Office Visit on 12/07/2017   Component Date Value Ref Range Status    WBC 12/07/2017 7.7  3.4 - 10.8 x10E3/uL Final    RBC 12/07/2017 4.81  3.77 - 5.28 x10E6/uL Final    HGB 12/07/2017 13.3  11.1 - 15.9 g/dL Final                  **Please note reference interval change**    HCT 12/07/2017 39.6  34.0 - 46.6 % Final    MCV 12/07/2017 82  79 - 97 fL Final    MCH 12/07/2017 27.7  26.6 - 33.0 pg Final    MCHC 12/07/2017 33.6  31.5 - 35.7 g/dL Final    RDW 12/07/2017 15.7* 12.3 - 15.4 % Final    PLATELET 90/56/0307 538  150 - 379 x10E3/uL Final    NEUTROPHILS 12/07/2017 58  Not Estab. % Final    Lymphocytes 12/07/2017 30  Not Estab. % Final    MONOCYTES 12/07/2017 8  Not Estab. % Final    EOSINOPHILS 12/07/2017 4  Not Estab. % Final    BASOPHILS 12/07/2017 0  Not Estab. % Final    ABS. NEUTROPHILS 12/07/2017 4.5  1.4 - 7.0 x10E3/uL Final    Abs Lymphocytes 12/07/2017 2.3  0.7 - 3.1 x10E3/uL Final    ABS. MONOCYTES 12/07/2017 0.6  0.1 - 0.9 x10E3/uL Final    ABS. EOSINOPHILS 12/07/2017 0.3  0.0 - 0.4 x10E3/uL Final    ABS. BASOPHILS 12/07/2017 0.0  0.0 - 0.2 x10E3/uL Final    IMMATURE GRANULOCYTES 12/07/2017 0  Not Estab. % Final    ABS. IMM. GRANS.  12/07/2017 0.0  0.0 - 0.1 x10E3/uL Final    TSH 12/07/2017 2.350  0.450 - 4.500 uIU/mL Final    Glucose 12/07/2017 108* 65 - 99 mg/dL Final    BUN 12/07/2017 13  6 - 20 mg/dL Final    Creatinine 12/07/2017 0.63  0.57 - 1.00 mg/dL Final    GFR est non-AA 12/07/2017 120  >59 mL/min/1.73 Final    GFR est AA 12/07/2017 138  >59 mL/min/1.73 Final    BUN/Creatinine ratio 12/07/2017 21  9 - 23 Final    Sodium 12/07/2017 141  134 - 144 mmol/L Final    Potassium 12/07/2017 4.0  3.5 - 5.2 mmol/L Final    Chloride 12/07/2017 100  96 - 106 mmol/L Final    CO2 12/07/2017 25  18 - 29 mmol/L Final    Calcium 12/07/2017 9.4  8.7 - 10.2 mg/dL Final    Protein, total 12/07/2017 6.9  6.0 - 8.5 g/dL Final    Albumin 12/07/2017 4.4  3.5 - 5.5 g/dL Final    GLOBULIN, TOTAL 12/07/2017 2.5  1.5 - 4.5 g/dL Final    A-G Ratio 12/07/2017 1.8  1.2 - 2.2 Final    Bilirubin, total 12/07/2017 0.3  0.0 - 1.2 mg/dL Final    Alk. phosphatase 12/07/2017 101  39 - 117 IU/L Final    AST (SGOT) 12/07/2017 13  0 - 40 IU/L Final    ALT (SGPT) 12/07/2017 21  0 - 32 IU/L Final         Objective:       Exam:  Visit Vitals    /70    Pulse 75    Wt 74.4 kg (164 lb)    SpO2 99%    BMI 29.05 kg/m2     Gen: Awake, alert, follows commands  Appropriate appearance, normal speech. Oriented to all spheres. No visual field defect on confrontation exam.  Full eyes movement, with no nystagmus, no diplopia, no ptosis. Normal gag and swallow. All remaining cranial nerves were normal  Motor function: 5/5 in all extremities  Sensory: intact to LT, PP and JPS  DTRs ++ in all extremities, (-) Babinski  Good FTN and HTS   Gait: Normal    Assessment:       ICD-10-CM ICD-9-CM    1. Migraine with aura and with status migrainosus, not intractable G43.101 346.03      Neck pain      Plan:   1. I personally reviewed the MRI brain and MRI cervical spine images with the patient. Assured her both are within normal limits. Blood work was also unremarkable  2. Continue Imitrex 50 mg to try to abort headaches  3. Continue headache diary and went through the list that can trigger headache (caffeine, stress, bright light, MSG,salt)  4.  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     Amber Xavier MD  Diplomate, American Board of Psychiatry and Neurology  Diplomate, Neuromuscular Medicine  Diplomate, American Board of Electrodiagnostic Medicine

## 2018-01-19 NOTE — MR AVS SNAPSHOT
[facility] 
 
 
 [place]  [facility] [address] [place] [id number] [phone] Patient: Janet Tillman MRN: [id number] :1985 Visit Information Date & Time Provider Department Dept. Phone Encounter #  
 2018  3:40 PM Amber Xavier MD [facility] [phone] [id number] Follow-up Instructions Return if symptoms worsen or fail to improve. Upcoming Health Maintenance Date Due Pneumococcal  Medium Risk (1 of 1 - PPSV23) 10/17/2005 DTaP/Tdap/Td series (1 - Tdap) 10/17/2007 PAP AKA CERVICAL CYTOLOGY 10/17/2007 Influenza Age 5 to Adult 2017 Allergies as of 2018  Review Complete On: 2018 By: Chacha Barnes LPN Severity Noted Reaction Type Reactions Melon High 2016    Anaphylaxis Nsaids (Non-steroidal Anti-inflammatory Drug) High 2016    Anaphylaxis Other Medication  2016    Swelling Allergic to steroids Singulair [Montelukast]  2016   Side Effect Other (comments) Numbness in arms and face, drowsiness Current Immunizations  Reviewed on 2016 No immunizations on file. Not reviewed this visit Vitals BP Pulse Weight(growth percentile) SpO2 BMI OB Status 120/70 75 164 lb (74.4 kg) 99% 29.05 kg/m2 Having regular periods Smoking Status Former Smoker BMI and BSA Data Body Mass Index Body Surface Area 29.05 kg/m 2 1.82 m 2 Preferred Pharmacy Pharmacy Name Phone [id number] [place], [address] [phone] Your Updated Medication List  
  
   
This list is accurate as of: 18  4:26 PM.  Always use your most recent med list.  
  
  
  
  
 albuterol 90 mcg/actuation inhaler Commonly known as:  PROVENTIL HFA, VENTOLIN HFA, PROAIR HFA  
 Take 2 Puffs by inhalation every four (4) hours as needed for Wheezing. cyclobenzaprine 10 mg tablet Commonly known as:  FLEXERIL Take 1 Tab by mouth nightly as needed for Muscle Spasm(s). montelukast 10 mg tablet Commonly known as:  SINGULAIR Take 1 Tab by mouth daily. nortriptyline 10 mg capsule Commonly known as:  PAMELOR Take 1 Cap by mouth nightly. omeprazole 20 mg capsule Commonly known as:  PRILOSEC Take 1 Cap by mouth daily. Take with water in AM, wait 30-60 min before eating. SUMAtriptan 50 mg tablet Commonly known as:  IMITREX Take 1 tab at onset of headache. May take another 1 tab after 2 hrs. Max 2 tabs / 24 hrs Follow-up Instructions Return if symptoms worsen or fail to improve. Patient Instructions PRESCRIPTION REFILL POLICY [facility] Statement to Patients April 1, 2014 In an effort to ensure the large volume of patient prescription refills is processed in the most efficient and expeditious manner, we are asking our patients to assist us by calling your Pharmacy for all prescription refills, this will include also your  Mail Order Pharmacy. The pharmacy will contact our office electronically to continue the refill process. Please do not wait until the last minute to call your pharmacy. We need at least 48 hours (2days) to fill prescriptions. We also encourage you to call your pharmacy before going to  your prescription to make sure it is ready. With regard to controlled substance prescription refill requests (narcotic refills) that need to be picked up at our office, we ask your cooperation by providing us with at least 72 hours (3days) notice that you will need a refill. We will not refill narcotic prescription refill requests after 4:00pm on any weekday, Monday through Thursday, or after 2:00pm on Fridays, or on the weekends. We encourage everyone to explore another way of getting your prescription refill request processed using [employer], our patient web portal through our electronic medical record system. [employer] is an efficient and effective way to communicate your medication request directly to the office and  downloadable as an parminder on your smart phone . [employer] also features a review functionality that allows you to view your medication list as well as leave messages for your physician. Are you ready to get connected? If so please review the attatched instructions or speak to any of our staff to get you set up right away! Thank you so much for your cooperation. Should you have any questions please contact our Practice Administrator. The Physicians and Staff,  [facility] Introducing [facility]! Dear Alicia Lopez: Thank you for requesting a [employer] account. Our records indicate that you already have an active [employer] account. You can access your account anytime at [email or web address]. [employer] Did you know that you can access your hospital and ER discharge instructions at any time in [employer]? You can also review all of your test results from your hospital stay or ER visit. Additional Information If you have questions, please visit the Frequently Asked Questions section of the [employer] website at [email or web address] [email or web address]/. Remember, [employer] is NOT to be used for urgent needs. For medical emergencies, dial 911. Now available from your iPhone and Android! Please provide this summary of care documentation to your next provider. Your primary care clinician is listed as Lillian Richardson. If you have any questions after today's visit, please call [phone].

## 2018-01-19 NOTE — LETTER
1/19/2018 4:33 PM 
 
Patient:  Forest Fleming YOB: 1985 Date of Visit: 1/19/2018 Dear Suellen Hurst, MARISA 
[address] [place] [address] VIA In Basket 
 : Thank you for referring Ms. Forest Fleming to me for evaluation/treatment. Below are the relevant portions of my assessment and plan of care. If you have questions, please do not hesitate to call me. I look forward to following Ms. Mortimer along with you. Sincerely, Felicity Cordero MD

## 2018-07-03 ENCOUNTER — OFFICE VISIT (OUTPATIENT)
Dept: FAMILY MEDICINE CLINIC | Age: 33
End: 2018-07-03

## 2018-07-03 VITALS
WEIGHT: 160 LBS | DIASTOLIC BLOOD PRESSURE: 75 MMHG | TEMPERATURE: 98.4 F | HEIGHT: 63 IN | HEART RATE: 74 BPM | SYSTOLIC BLOOD PRESSURE: 117 MMHG | RESPIRATION RATE: 18 BRPM | BODY MASS INDEX: 28.35 KG/M2 | OXYGEN SATURATION: 98 %

## 2018-07-03 DIAGNOSIS — R30.0 BURNING WITH URINATION: ICD-10-CM

## 2018-07-03 DIAGNOSIS — R35.0 URINARY FREQUENCY: Primary | ICD-10-CM

## 2018-07-03 LAB
BILIRUB UR QL STRIP: NORMAL
GLUCOSE UR-MCNC: NEGATIVE MG/DL
KETONES P FAST UR STRIP-MCNC: NEGATIVE MG/DL
PH UR STRIP: 6 [PH] (ref 4.6–8)
PROT UR QL STRIP: NORMAL
SP GR UR STRIP: 1.03 (ref 1–1.03)
UA UROBILINOGEN AMB POC: NORMAL (ref 0.2–1)
URINALYSIS CLARITY POC: CLEAR
URINALYSIS COLOR POC: YELLOW
URINE BLOOD POC: NORMAL
URINE LEUKOCYTES POC: NORMAL
URINE NITRITES POC: NEGATIVE

## 2018-07-03 RX ORDER — NITROFURANTOIN 25; 75 MG/1; MG/1
100 CAPSULE ORAL 2 TIMES DAILY
Qty: 10 CAP | Refills: 0 | Status: SHIPPED | OUTPATIENT
Start: 2018-07-03 | End: 2018-07-08

## 2018-07-03 NOTE — PROGRESS NOTES
Chief Complaint   Patient presents with    Urinary Frequency    Urinary Burning    Urinary Retention     she is a 33y.o. year old female who presents for evalution. Pt states has been having dysuria, frequency and pelvic pain for past 3 days. Has been worsening. Has not tried any OTCs. No low back pain, fever or chills. Reviewed PmHx, RxHx, FmHx, SocHx, AllgHx and updated and dated in the chart. Review of Systems - negative except as listed above in the HPI    Objective:     Vitals:    07/03/18 1451   BP: 117/75   Pulse: 74   Resp: 18   Temp: 98.4 °F (36.9 °C)   TempSrc: Oral   SpO2: 98%   Weight: 160 lb (72.6 kg)   Height: 5' 3\" (1.6 m)     Physical Examination: General appearance - alert, well appearing, and in no distress  Chest - clear to auscultation, no wheezes, rales or rhonchi, symmetric air entry  Heart - normal rate, regular rhythm, normal S1, S2, no murmurs, rubs, clicks or gallops  Abdomen - soft, nontender, nondistended, no masses or organomegaly  no CVA tenderness    Assessment/ Plan:   Diagnoses and all orders for this visit:    1. Urinary frequency  -     AMB POC URINALYSIS DIP STICK AUTO W/O MICRO  -     nitrofurantoin, macrocrystal-monohydrate, (MACROBID) 100 mg capsule; Take 1 Cap by mouth two (2) times a day for 5 days. -     CULTURE, URINE  Pt instructed to take full course of antibiotics and increase water consumption. F/U if no improvement or develops fever/chills/nausea/vomiting. 2. Burning with urination  -     AMB POC URINALYSIS DIP STICK AUTO W/O MICRO  -     nitrofurantoin, macrocrystal-monohydrate, (MACROBID) 100 mg capsule; Take 1 Cap by mouth two (2) times a day for 5 days. -     CULTURE, URINE    Pt voiced understanding regarding plan of care. Follow-up Disposition:  Return if symptoms worsen or fail to improve. I have discussed the diagnosis with the patient and the intended plan as seen in the above orders.   The patient has received an after-visit summary and questions were answered concerning future plans.      Medication Side Effects and Warnings were discussed with patient    Kathy Galvin NP

## 2018-07-03 NOTE — PROGRESS NOTES
Chief Complaint   Patient presents with    Urinary Frequency    Urinary Burning    Urinary Retention     Patient in office today for uti sx that began Sunday. Have not treated with otc. 1. Have you been to the ER, urgent care clinic since your last visit? Hospitalized since your last visit? No    2. Have you seen or consulted any other health care providers outside of the [address] [place] [address] since your last visit? Include any pap smears or colon screening.  No

## 2018-07-03 NOTE — LETTER
7/3/2018 3:04 PM 
 
Ms. Juliet Preston [address] [facility] [id number] [facility] [id number] Please excuse MsCharles Amaya Jeyson from work this afternoon, she had a doctor's appointment. Sincerely, Reynaldo Martinez NP

## 2018-07-03 NOTE — MR AVS SNAPSHOT
[address] [place] [address] 
[phone] Patient: Katie Mohan MRN: [id number] :1985 Visit Information Date & Time Provider Department Dept. Phone Encounter #  
 7/3/2018  2:45 PM Racheal Francisco NP [address] [facility] [phone] [id number] Follow-up Instructions Return if symptoms worsen or fail to improve. Upcoming Health Maintenance Date Due Pneumococcal 19-64 Medium Risk (1 of 1 - PPSV23) 10/17/2005 DTaP/Tdap/Td series (1 - Tdap) 10/17/2007 PAP AKA CERVICAL CYTOLOGY 10/17/2007 Influenza Age 5 to Adult 2018 Allergies as of 7/3/2018  Review Complete On: 7/3/2018 By: Maureen Hollis LPN Severity Noted Reaction Type Reactions Melon High 2016    Anaphylaxis Nsaids (Non-steroidal Anti-inflammatory Drug) High 2016    Anaphylaxis Other Medication  2016    Swelling Allergic to steroids Singulair [Montelukast]  2016   Side Effect Other (comments) Numbness in arms and face, drowsiness Current Immunizations  Reviewed on 2016 No immunizations on file. Not reviewed this visit You Were Diagnosed With   
  
 Codes Comments Urinary frequency    -  Primary ICD-10-CM: R35.0 ICD-9-CM: 788.41 Burning with urination     ICD-10-CM: R30.0 ICD-9-CM: 175. 1 Vitals BP Pulse Temp Resp Height(growth percentile) Weight(growth percentile) 117/75 (BP 1 Location: Right arm, BP Patient Position: Sitting) 74 98.4 °F (36.9 °C) (Oral) 18 5' 3\" (1.6 m) 160 lb (72.6 kg) LMP SpO2 BMI OB Status Smoking Status 2018 98% 28.34 kg/m2 Having regular periods Former Smoker Vitals History BMI and BSA Data Body Mass Index Body Surface Area  
 28.34 kg/m 2 1.8 m 2 Preferred Pharmacy Pharmacy Name Phone [id number], [address] [facility] [phone] Your Updated Medication List  
  
   
This list is accurate as of 7/3/18  3:05 PM.  Always use your most recent med list.  
  
  
  
  
 albuterol 90 mcg/actuation inhaler Commonly known as:  PROVENTIL HFA, VENTOLIN HFA, PROAIR HFA Take 2 Puffs by inhalation every four (4) hours as needed for Wheezing. cyclobenzaprine 10 mg tablet Commonly known as:  FLEXERIL Take 1 Tab by mouth nightly as needed for Muscle Spasm(s). montelukast 10 mg tablet Commonly known as:  SINGULAIR Take 1 Tab by mouth daily. nitrofurantoin (macrocrystal-monohydrate) 100 mg capsule Commonly known as:  MACROBID Take 1 Cap by mouth two (2) times a day for 5 days. nortriptyline 10 mg capsule Commonly known as:  PAMELOR Take 1 Cap by mouth nightly. omeprazole 20 mg capsule Commonly known as:  PRILOSEC Take 1 Cap by mouth daily. Take with water in AM, wait 30-60 min before eating. SUMAtriptan 50 mg tablet Commonly known as:  IMITREX Take 1 tab at onset of headache. May take another 1 tab after 2 hrs. Max 2 tabs / 24 hrs Prescriptions Sent to Pharmacy Refills  
 nitrofurantoin, macrocrystal-monohydrate, (MACROBID) 100 mg capsule 0 Sig: Take 1 Cap by mouth two (2) times a day for 5 days. Class: Normal  
 Pharmacy: [facility] Ph #: [phone] Route: Oral  
  
We Performed the Following AMB POC URINALYSIS DIP STICK AUTO W/O MICRO [34056 CPT(R)] CULTURE, URINE [id number] CPT(R)] Follow-up Instructions Return if symptoms worsen or fail to improve. Introducing [facility]! Dear Jane Coleman: Thank you for requesting a [employer] account. Our records indicate that you already have an active [employer] account. You can access your account anytime at [email or web address] Did you know that you can access your hospital and ER discharge instructions at any time in [employer]? You can also review all of your test results from your hospital stay or ER visit. Additional Information If you have questions, please visit the Frequently Asked Questions section of the [employer] website at [email or web address] Remember, [employer] is NOT to be used for urgent needs. For medical emergencies, dial 911. Now available from your iPhone and Android! Please provide this summary of care documentation to your next provider. Your primary care clinician is listed as Araceli Blizzard. If you have any questions after today's visit, please call [phone].

## 2018-07-05 LAB — BACTERIA UR CULT: ABNORMAL

## 2019-05-14 ENCOUNTER — OFFICE VISIT (OUTPATIENT)
Dept: FAMILY MEDICINE CLINIC | Age: 34
End: 2019-05-14

## 2019-05-14 VITALS
RESPIRATION RATE: 12 BRPM | DIASTOLIC BLOOD PRESSURE: 80 MMHG | HEART RATE: 73 BPM | BODY MASS INDEX: 27.64 KG/M2 | TEMPERATURE: 98.2 F | WEIGHT: 156 LBS | HEIGHT: 63 IN | SYSTOLIC BLOOD PRESSURE: 110 MMHG | OXYGEN SATURATION: 99 %

## 2019-05-14 DIAGNOSIS — F41.9 ANXIETY AND DEPRESSION: Primary | ICD-10-CM

## 2019-05-14 DIAGNOSIS — F32.A ANXIETY AND DEPRESSION: Primary | ICD-10-CM

## 2019-05-14 DIAGNOSIS — L98.9 SKIN LESION OF HAND: ICD-10-CM

## 2019-05-14 RX ORDER — BUPROPION HYDROCHLORIDE 150 MG/1
150 TABLET ORAL
Qty: 30 TAB | Refills: 2 | Status: SHIPPED | OUTPATIENT
Start: 2019-05-14 | End: 2019-08-04 | Stop reason: SDUPTHER

## 2019-05-14 NOTE — PROGRESS NOTES
Chief Complaint   Patient presents with    Medication Evaluation     Pt in office today to discuss meds  -pt states she was on lexapro but got off bc she didn't like how it made her feel  -pt states her mood was stable but she wasn't emotionally connected  -pt states she needs to go back on something   -pt states she has been struggling with depression/anxiety and also has adhd  Pt has a spot that showed up on her left hand that showed 2-3weeks ago   -pt denies pain or itching   -pt thought it was a bruise but it has not yet gone away      Pt has no other concerns

## 2019-05-14 NOTE — PROGRESS NOTES
HISTORY OF PRESENT ILLNESS  José Luis Almazan is a 29 y.o. female. HPI  Pt in office today to discuss meds  -pt states she was on lexapro but got off bc she didn't like how it made her feel  -pt states her mood was stable but she wasn't emotionally connected  -pt states she needs to go back on something   -pt states she has been struggling with depression/anxiety and also has adhd  Pt has a spot that showed up on her left hand that showed 2-3weeks ago   -pt denies pain or itching   -pt thought it was a bruise but it has not yet gone away      ROS  A comprehensive review of system was obtained and negative except findings in the HPI    Visit Vitals  /80 (BP 1 Location: Right arm, BP Patient Position: Sitting)   Pulse 73   Temp 98.2 °F (36.8 °C) (Oral)   Resp 12   Ht 5' 3\" (1.6 m)   Wt 156 lb (70.8 kg)   LMP 04/30/2019   SpO2 99%   BMI 27.63 kg/m²     Physical Exam   Constitutional: She is oriented to person, place, and time. She appears well-developed and well-nourished. Neck: No JVD present. Cardiovascular: Normal rate, regular rhythm and intact distal pulses. Exam reveals no gallop and no friction rub. No murmur heard. Pulmonary/Chest: Effort normal and breath sounds normal. No respiratory distress. She has no wheezes. Musculoskeletal: She exhibits no edema. Neurological: She is alert and oriented to person, place, and time. Skin: Skin is warm. Nursing note and vitals reviewed. ASSESSMENT and PLAN  Encounter Diagnoses   Name Primary?  Anxiety and depression Yes    Skin lesion of hand      Orders Placed This Encounter    REFERRAL TO DERMATOLOGY    buPROPion XL (WELLBUTRIN XL) 150 mg tablet     Referral to Derm for skin assessment due to pigment changes  Start wellbutrin xl 150mg a day  Follow-up and Dispositions    · Return in about 3 weeks (around 6/4/2019) for med check. I have discussed the diagnosis with the patient and the intended plan as seen in the above orders.  The patient has received an after-visit summary and questions were answered concerning future plans. Patient conveyed understanding of the plan at the time of the visit.     Margaret Daniels MSN, ANP  5/14/2019

## 2019-06-04 ENCOUNTER — OFFICE VISIT (OUTPATIENT)
Dept: FAMILY MEDICINE CLINIC | Age: 34
End: 2019-06-04

## 2019-06-04 VITALS
DIASTOLIC BLOOD PRESSURE: 76 MMHG | RESPIRATION RATE: 12 BRPM | HEART RATE: 66 BPM | BODY MASS INDEX: 27.64 KG/M2 | HEIGHT: 63 IN | SYSTOLIC BLOOD PRESSURE: 112 MMHG | OXYGEN SATURATION: 98 % | WEIGHT: 156 LBS | TEMPERATURE: 98.1 F

## 2019-06-04 DIAGNOSIS — F41.9 ANXIETY AND DEPRESSION: Primary | ICD-10-CM

## 2019-06-04 DIAGNOSIS — F32.A ANXIETY AND DEPRESSION: Primary | ICD-10-CM

## 2019-06-04 NOTE — PROGRESS NOTES
HISTORY OF PRESENT ILLNESS  Rodriguez Kiser is a 29 y.o. female. HPI  Started wellbutrin last visit  She states that her anxiety is much improved  Sleeping better  Family says she is now impatient and \"meaner\"  No adr/se otherwise    ROS  A comprehensive review of system was obtained and negative except findings in the HPI    Visit Vitals  /76 (BP 1 Location: Right arm, BP Patient Position: Sitting)   Pulse 66   Temp 98.1 °F (36.7 °C) (Oral)   Resp 12   Ht 5' 3\" (1.6 m)   Wt 156 lb (70.8 kg)   LMP 05/30/2019   SpO2 98%   BMI 27.63 kg/m²     Physical Exam   Constitutional: She is oriented to person, place, and time. She appears well-developed and well-nourished. Neck: No JVD present. Cardiovascular: Normal rate, regular rhythm and intact distal pulses. Exam reveals no gallop and no friction rub. No murmur heard. Pulmonary/Chest: Effort normal and breath sounds normal. No respiratory distress. She has no wheezes. Musculoskeletal: She exhibits no edema. Neurological: She is alert and oriented to person, place, and time. Skin: Skin is warm. Nursing note and vitals reviewed. ASSESSMENT and PLAN  Encounter Diagnoses   Name Primary?  Anxiety and depression Yes     No orders of the defined types were placed in this encounter. Does not want to adjust or add meds at this time  Follow up November otherwise    I have discussed the diagnosis with the patient and the intended plan as seen in the above orders. The patient has received an after-visit summary and questions were answered concerning future plans. Patient conveyed understanding of the plan at the time of the visit.     Marsha Castillo, MSN, ANP  6/4/2019

## 2019-06-04 NOTE — PROGRESS NOTES
Chief Complaint   Patient presents with    Medication Evaluation     Pt in office today for me eval  -pt states med is doing well    Pt has no other concerns

## 2019-07-10 RX ORDER — ARIPIPRAZOLE 5 MG/1
5 TABLET ORAL DAILY
Qty: 30 TAB | Refills: 2 | Status: SHIPPED | OUTPATIENT
Start: 2019-07-10

## 2019-08-04 RX ORDER — BUPROPION HYDROCHLORIDE 150 MG/1
TABLET ORAL
Qty: 30 TAB | Refills: 2 | Status: SHIPPED | OUTPATIENT
Start: 2019-08-04 | End: 2019-08-05

## 2019-08-05 RX ORDER — BUPROPION HYDROCHLORIDE 300 MG/1
300 TABLET ORAL
Qty: 90 TAB | Refills: 3 | Status: SHIPPED | OUTPATIENT
Start: 2019-08-05

## 2020-06-02 ENCOUNTER — HOSPITAL ENCOUNTER (EMERGENCY)
Age: 35
Discharge: STILL ADMITTED OR ADMINISTRATIVE | End: 2020-06-02

## 2020-12-10 ENCOUNTER — OFFICE VISIT (OUTPATIENT)
Dept: FAMILY MEDICINE CLINIC | Age: 35
End: 2020-12-10

## 2020-12-10 VITALS
SYSTOLIC BLOOD PRESSURE: 109 MMHG | DIASTOLIC BLOOD PRESSURE: 97 MMHG | HEIGHT: 63 IN | OXYGEN SATURATION: 98 % | RESPIRATION RATE: 16 BRPM | WEIGHT: 164 LBS | HEART RATE: 87 BPM | TEMPERATURE: 98.3 F | BODY MASS INDEX: 29.06 KG/M2

## 2020-12-10 DIAGNOSIS — H53.9 VISION CHANGES: ICD-10-CM

## 2020-12-10 DIAGNOSIS — M79.10 MYALGIA: ICD-10-CM

## 2020-12-10 DIAGNOSIS — Z00.00 WELL ADULT EXAM: ICD-10-CM

## 2020-12-10 DIAGNOSIS — G56.03 BILATERAL CARPAL TUNNEL SYNDROME: Primary | ICD-10-CM

## 2020-12-10 PROCEDURE — 99395 PREV VISIT EST AGE 18-39: CPT | Performed by: NURSE PRACTITIONER

## 2020-12-10 NOTE — PROGRESS NOTES
Chief Complaint   Patient presents with    Tingling     Pt in office today for tingling  -pt states that she has been having numbness in her pinkies and left leg  -pt states that her left leg was numb for 10mins  -pt reports this has been going of for 6yrs  -pt reports recently she has notice more issues   -pt also has concerns with her vision that she feels isnt related to her vision  -pt states she is having pain in her right eye and having trouble looking to the side for the last 2yrs  -pt has concerns with coughing after eating and drinking like she cant clear her thoat    1. Have you been to the ER, urgent care clinic since your last visit? Hospitalized since your last visit? No    2. Have you seen or consulted any other health care providers outside of the [address] [place] [address] since your last visit? Include any pap smears or colon screening.  No     Pt has no other concerns

## 2020-12-11 LAB
ALBUMIN SERPL-MCNC: 4.6 G/DL (ref 3.8–4.8)
ALBUMIN/GLOB SERPL: 1.8 {RATIO} (ref 1.2–2.2)
ALP SERPL-CCNC: 110 IU/L (ref 39–117)
ALT SERPL-CCNC: 24 IU/L (ref 0–32)
AST SERPL-CCNC: 19 IU/L (ref 0–40)
BASOPHILS # BLD AUTO: 0 X10E3/UL (ref 0–0.2)
BASOPHILS NFR BLD AUTO: 1 %
BILIRUB SERPL-MCNC: 0.4 MG/DL (ref 0–1.2)
BUN SERPL-MCNC: 12 MG/DL (ref 6–20)
BUN/CREAT SERPL: 15 (ref 9–23)
CALCIUM SERPL-MCNC: 9.4 MG/DL (ref 8.7–10.2)
CHLORIDE SERPL-SCNC: 101 MMOL/L (ref 96–106)
CHOLEST SERPL-MCNC: 144 MG/DL (ref 100–199)
CO2 SERPL-SCNC: 22 MMOL/L (ref 20–29)
CREAT SERPL-MCNC: 0.78 MG/DL (ref 0.57–1)
EOSINOPHIL # BLD AUTO: 0.2 X10E3/UL (ref 0–0.4)
EOSINOPHIL NFR BLD AUTO: 3 %
ERYTHROCYTE [DISTWIDTH] IN BLOOD BY AUTOMATED COUNT: 13.1 % (ref 11.7–15.4)
GLOBULIN SER CALC-MCNC: 2.5 G/DL (ref 1.5–4.5)
GLUCOSE SERPL-MCNC: 106 MG/DL (ref 65–99)
HCT VFR BLD AUTO: 42.2 % (ref 34–46.6)
HDLC SERPL-MCNC: 38 MG/DL
HGB BLD-MCNC: 14.4 G/DL (ref 11.1–15.9)
IMM GRANULOCYTES # BLD AUTO: 0 X10E3/UL (ref 0–0.1)
IMM GRANULOCYTES NFR BLD AUTO: 0 %
INTERPRETATION, 910389: NORMAL
LDLC SERPL CALC-MCNC: 79 MG/DL (ref 0–99)
LYMPHOCYTES # BLD AUTO: 2.3 X10E3/UL (ref 0.7–3.1)
LYMPHOCYTES NFR BLD AUTO: 35 %
MAGNESIUM SERPL-MCNC: 1.9 MG/DL (ref 1.6–2.3)
MCH RBC QN AUTO: 28.9 PG (ref 26.6–33)
MCHC RBC AUTO-ENTMCNC: 34.1 G/DL (ref 31.5–35.7)
MCV RBC AUTO: 85 FL (ref 79–97)
MONOCYTES # BLD AUTO: 0.5 X10E3/UL (ref 0.1–0.9)
MONOCYTES NFR BLD AUTO: 7 %
NEUTROPHILS # BLD AUTO: 3.5 X10E3/UL (ref 1.4–7)
NEUTROPHILS NFR BLD AUTO: 54 %
PLATELET # BLD AUTO: 277 X10E3/UL (ref 150–450)
POTASSIUM SERPL-SCNC: 4 MMOL/L (ref 3.5–5.2)
PROT SERPL-MCNC: 7.1 G/DL (ref 6–8.5)
RBC # BLD AUTO: 4.98 X10E6/UL (ref 3.77–5.28)
SODIUM SERPL-SCNC: 141 MMOL/L (ref 134–144)
TRIGL SERPL-MCNC: 157 MG/DL (ref 0–149)
TSH SERPL DL<=0.005 MIU/L-ACNC: 2.91 UIU/ML (ref 0.45–4.5)
VIT B12 SERPL-MCNC: 560 PG/ML (ref 232–1245)
VLDLC SERPL CALC-MCNC: 27 MG/DL (ref 5–40)
WBC # BLD AUTO: 6.5 X10E3/UL (ref 3.4–10.8)

## 2020-12-11 NOTE — PROGRESS NOTES
Subjective:   30 y.o. female for Well Woman Check. Her gyne and breast care is done elsewhere by her Ob-Gyne physician. Patient Active Problem List    Diagnosis Date Noted    Migraine with aura and with status migrainosus, not intractable 12/08/2017    Anxiety and depression 03/28/2016    Mild persistent asthma without complication 79/38/0182     Current Outpatient Medications   Medication Sig Dispense Refill    buPROPion XL (WELLBUTRIN XL) 300 mg XL tablet Take 1 Tab by mouth every morning. 90 Tab 3    ARIPiprazole (ABILIFY) 5 mg tablet Take 1 Tab by mouth daily. - 7pm 30 Tab 2    nortriptyline (PAMELOR) 10 mg capsule Take 1 Cap by mouth nightly. 30 Cap 5    SUMAtriptan (IMITREX) 50 mg tablet Take 1 tab at onset of headache. May take another 1 tab after 2 hrs. Max 2 tabs / 24 hrs 12 Tab 3    cyclobenzaprine (FLEXERIL) 10 mg tablet Take 1 Tab by mouth nightly as needed for Muscle Spasm(s). 30 Tab 1    omeprazole (PRILOSEC) 20 mg capsule Take 1 Cap by mouth daily. Take with water in AM, wait 30-60 min before eating. 30 Cap 0    montelukast (SINGULAIR) 10 mg tablet Take 1 Tab by mouth daily. 30 Tab 5    albuterol (PROVENTIL HFA, VENTOLIN HFA, PROAIR HFA) 90 mcg/actuation inhaler Take 2 Puffs by inhalation every four (4) hours as needed for Wheezing. 1 Inhaler 1     Family History   Problem Relation Age of Onset    Cancer Father     Diabetes Father     Hypertension Father     Cancer Maternal Grandmother      Social History     Tobacco Use    Smoking status: Former Smoker    Smokeless tobacco: Never Used   Substance Use Topics    Alcohol use: No     Alcohol/week: 0.0 standard drinks         ROS: Feeling generally well. No TIA's or unusual headaches, no dysphagia. No prolonged cough. No dyspnea or chest pain on exertion. No abdominal pain, change in bowel habits, black or bloody stools. No urinary tract symptoms. No new or unusual musculoskeletal symptoms.     Specific concerns today:   .Pt in office today for tingling  -pt states that she has been having numbness in her pinkies and left leg  -pt states that her left leg was numb for 10mins  -pt reports this has been going of for 6yrs  -pt reports recently she has notice more issues     -pt also has concerns with her vision that she feels isnt related to her vision, has no depth perception, hits head on table or objects because she can't tell how close she is to something, runs into walls for the same reason. She feels like the room looks smokey at times and is trying to wave the smoke away from her hands when there is nothing there.  -pt states she is having pain in her right eye and having trouble looking to the side for the last 2yrs    -pt has concerns with coughing after eating and drinking like she cant clear her thoat      Objective: The patient appears well, alert, oriented x 3, in no distress. Visit Vitals  BP (!) 109/97 (BP 1 Location: Left arm, BP Patient Position: Sitting)   Pulse 87   Temp 98.3 °F (36.8 °C) (Oral)   Resp 16   Ht 5' 3\" (1.6 m)   Wt 164 lb (74.4 kg)   SpO2 98%   BMI 29.05 kg/m²     ENT normal.  Neck supple. No adenopathy or thyromegaly. GEREMIAS. Lungs are clear, good air entry, no wheezes, rhonchi or rales. S1 and S2 normal, no murmurs, regular rate and rhythm. Abdomen soft without tenderness, guarding, mass or organomegaly. Extremities show no edema, normal peripheral pulses. Neurological is normal, no focal findings. Breast and Pelvic exams are deferred. Assessment/Plan:   Well Woman  lose weight, increase physical activity, follow low fat diet, follow low salt diet, routine labs ordered, there is clearly a neurological issue going on magdalena with vision. She has had an MRI of brain and c-spine in the last 2 years that was normal. Referral to neuro-ophtho for eval as well. All labs updated. Sent to ortho for CTS for the tingling of the hands as well. Encounter Diagnoses   Name Primary?     Bilateral carpal tunnel syndrome Yes    Well adult exam     Vision changes     Myalgia      Orders Placed This Encounter    LIPID PANEL    CBC WITH AUTOMATED DIFF    METABOLIC PANEL, COMPREHENSIVE    TSH 3RD GENERATION    VITAMIN B12    MAGNESIUM    REFERRAL TO ORTHOPEDIC SURGERY    [address]FERRAL TO [address]     Labs updated today  Follow up pending labs and referral given to Ortho and Neuro-ophtho for eval    I have discussed the diagnosis with the patient and the intended plan as seen in the above orders. The patient has received an after-visit summary and questions were answered concerning future plans. Patient conveyed understanding of the plan at the time of the visit.     Cosmo Tomlinson, MSN, ANP  12/11/2020

## 2021-05-06 ENCOUNTER — E-VISIT (OUTPATIENT)
Dept: FAMILY MEDICINE CLINIC | Age: 36
End: 2021-05-06
Payer: COMMERCIAL

## 2021-05-06 DIAGNOSIS — J01.00 ACUTE MAXILLARY SINUSITIS, RECURRENCE NOT SPECIFIED: Primary | ICD-10-CM

## 2021-05-06 PROCEDURE — 99421 OL DIG E/M SVC 5-10 MIN: CPT | Performed by: NURSE PRACTITIONER

## 2021-05-06 RX ORDER — AZITHROMYCIN 250 MG/1
TABLET, FILM COATED ORAL
Qty: 6 TAB | Refills: 0 | Status: SHIPPED | OUTPATIENT
Start: 2021-05-06 | End: 2021-05-11

## 2022-03-18 PROBLEM — G43.101 MIGRAINE WITH AURA AND WITH STATUS MIGRAINOSUS, NOT INTRACTABLE: Status: ACTIVE | Noted: 2017-12-08

## 2023-05-11 RX ORDER — NORTRIPTYLINE HYDROCHLORIDE 10 MG/1
CAPSULE ORAL
COMMUNITY
Start: 2017-12-08

## 2023-05-11 RX ORDER — BUPROPION HYDROCHLORIDE 300 MG/1
TABLET ORAL
COMMUNITY
Start: 2019-08-05

## 2023-05-11 RX ORDER — ARIPIPRAZOLE 5 MG/1
TABLET ORAL DAILY
COMMUNITY
Start: 2019-07-10

## 2023-05-11 RX ORDER — ALBUTEROL SULFATE 90 UG/1
2 AEROSOL, METERED RESPIRATORY (INHALATION) EVERY 4 HOURS PRN
COMMUNITY
Start: 2016-02-26

## 2023-05-11 RX ORDER — MONTELUKAST SODIUM 10 MG/1
TABLET ORAL DAILY
COMMUNITY
Start: 2016-02-26

## 2023-05-11 RX ORDER — SUMATRIPTAN 50 MG/1
TABLET, FILM COATED ORAL
COMMUNITY
Start: 2017-12-08

## 2023-05-11 RX ORDER — CYCLOBENZAPRINE HCL 10 MG
TABLET ORAL
COMMUNITY
Start: 2017-12-07

## 2023-05-11 RX ORDER — OMEPRAZOLE 20 MG/1
CAPSULE, DELAYED RELEASE ORAL DAILY
COMMUNITY
Start: 2017-12-07

## 2024-04-30 ENCOUNTER — HOSPITAL ENCOUNTER (EMERGENCY)
Facility: HOSPITAL | Age: 39
Discharge: HOME | End: 2024-04-30
Attending: EMERGENCY MEDICINE
Payer: COMMERCIAL

## 2024-04-30 ENCOUNTER — APPOINTMENT (OUTPATIENT)
Facility: HOSPITAL | Age: 39
End: 2024-04-30
Payer: COMMERCIAL

## 2024-04-30 VITALS
SYSTOLIC BLOOD PRESSURE: 124 MMHG | RESPIRATION RATE: 18 BRPM | TEMPERATURE: 98.5 F | HEART RATE: 85 BPM | OXYGEN SATURATION: 100 % | DIASTOLIC BLOOD PRESSURE: 86 MMHG

## 2024-04-30 DIAGNOSIS — E86.0 DEHYDRATION: ICD-10-CM

## 2024-04-30 DIAGNOSIS — R11.2 NAUSEA AND VOMITING, UNSPECIFIED VOMITING TYPE: ICD-10-CM

## 2024-04-30 DIAGNOSIS — R19.7 DIARRHEA, UNSPECIFIED TYPE: Primary | ICD-10-CM

## 2024-04-30 LAB
ALBUMIN SERPL-MCNC: 4.3 G/DL (ref 3.5–5.2)
ALBUMIN/GLOB SERPL: 1.4 (ref 1.1–2.2)
ALP SERPL-CCNC: 95 U/L (ref 35–104)
ALT SERPL-CCNC: 19 U/L (ref 10–35)
ANION GAP SERPL CALC-SCNC: 11 MMOL/L (ref 5–15)
APPEARANCE UR: ABNORMAL
AST SERPL-CCNC: 18 U/L (ref 10–35)
BACTERIA URNS QL MICRO: ABNORMAL /HPF
BASOPHILS # BLD: 0 K/UL (ref 0–1)
BASOPHILS NFR BLD: 0 % (ref 0–1)
BILIRUB SERPL-MCNC: 0.3 MG/DL (ref 0.2–1)
BILIRUB UR QL: NEGATIVE
BUN SERPL-MCNC: 14 MG/DL (ref 6–20)
BUN/CREAT SERPL: 23 (ref 12–20)
CALCIUM SERPL-MCNC: 9.2 MG/DL (ref 8.6–10)
CHLORIDE SERPL-SCNC: 104 MMOL/L (ref 98–107)
CO2 SERPL-SCNC: 24 MMOL/L (ref 22–29)
COLOR UR: ABNORMAL
CREAT SERPL-MCNC: 0.62 MG/DL (ref 0.5–0.9)
DIFFERENTIAL METHOD BLD: ABNORMAL
EOSINOPHIL # BLD: 0.1 K/UL (ref 0–0.4)
EOSINOPHIL NFR BLD: 2 %
EPITH CASTS URNS QL MICRO: ABNORMAL /LPF
ERYTHROCYTE [DISTWIDTH] IN BLOOD BY AUTOMATED COUNT: 14.4 % (ref 11.5–14.5)
GLOBULIN SER CALC-MCNC: 3 G/DL (ref 2–4)
GLUCOSE SERPL-MCNC: 107 MG/DL (ref 65–100)
GLUCOSE UR STRIP.AUTO-MCNC: NEGATIVE MG/DL
HCG UR QL: NEGATIVE
HCT VFR BLD AUTO: 40.5 % (ref 35–47)
HGB BLD-MCNC: 13.3 G/DL (ref 11.5–16)
HGB UR QL STRIP: ABNORMAL
IMM GRANULOCYTES # BLD AUTO: 0 K/UL (ref 0–0.04)
IMM GRANULOCYTES NFR BLD AUTO: 1 % (ref 0–0.5)
KETONES UR QL STRIP.AUTO: NEGATIVE MG/DL
LEUKOCYTE ESTERASE UR QL STRIP.AUTO: ABNORMAL
LIPASE SERPL-CCNC: 25 U/L (ref 13–60)
LYMPHOCYTES # BLD: 1.5 K/UL (ref 0.8–3.5)
LYMPHOCYTES NFR BLD: 24 % (ref 12–49)
MAGNESIUM SERPL-MCNC: 1.9 MG/DL (ref 1.6–2.6)
MCH RBC QN AUTO: 26.3 PG (ref 26–34)
MCHC RBC AUTO-ENTMCNC: 32.8 G/DL (ref 30–36.5)
MCV RBC AUTO: 80.2 FL (ref 80–99)
MONOCYTES # BLD: 0.7 K/UL (ref 0–1)
MONOCYTES NFR BLD: 11 % (ref 5–13)
NEUTS SEG # BLD: 4 K/UL (ref 1.8–8)
NEUTS SEG NFR BLD: 62 % (ref 32–75)
NITRITE UR QL STRIP.AUTO: NEGATIVE
NRBC # BLD: 0 K/UL (ref 0–0.01)
NRBC BLD-RTO: 0 PER 100 WBC
PH UR STRIP: 6 (ref 5–8)
PLATELET # BLD AUTO: 250 K/UL (ref 150–400)
PMV BLD AUTO: 9.3 FL (ref 8.9–12.9)
POTASSIUM SERPL-SCNC: 3.7 MMOL/L (ref 3.5–5.1)
PROT SERPL-MCNC: 7.3 G/DL (ref 6.4–8.3)
PROT UR STRIP-MCNC: NEGATIVE MG/DL
RBC # BLD AUTO: 5.05 M/UL (ref 3.8–5.2)
RBC #/AREA URNS HPF: ABNORMAL /HPF
SODIUM SERPL-SCNC: 139 MMOL/L (ref 136–145)
SP GR UR REFRACTOMETRY: 1.01 (ref 1–1.03)
TROPONIN T SERPL HS-MCNC: <6 NG/L (ref 0–14)
TSH SERPL DL<=0.05 MIU/L-ACNC: 2.42 UIU/ML (ref 0.27–4.2)
URINE CULTURE IF INDICATED: ABNORMAL
UROBILINOGEN UR QL STRIP.AUTO: 0.2 EU/DL (ref 0.2–1)
WBC # BLD AUTO: 6.4 K/UL (ref 3.6–11)
WBC URNS QL MICRO: ABNORMAL /HPF (ref 0–4)

## 2024-04-30 PROCEDURE — 83735 ASSAY OF MAGNESIUM: CPT

## 2024-04-30 PROCEDURE — 80053 COMPREHEN METABOLIC PANEL: CPT

## 2024-04-30 PROCEDURE — 99285 EMERGENCY DEPT VISIT HI MDM: CPT

## 2024-04-30 PROCEDURE — 71046 X-RAY EXAM CHEST 2 VIEWS: CPT

## 2024-04-30 PROCEDURE — 93005 ELECTROCARDIOGRAM TRACING: CPT | Performed by: EMERGENCY MEDICINE

## 2024-04-30 PROCEDURE — 87086 URINE CULTURE/COLONY COUNT: CPT

## 2024-04-30 PROCEDURE — 83690 ASSAY OF LIPASE: CPT

## 2024-04-30 PROCEDURE — 36415 COLL VENOUS BLD VENIPUNCTURE: CPT

## 2024-04-30 PROCEDURE — 2580000003 HC RX 258

## 2024-04-30 PROCEDURE — 85025 COMPLETE CBC W/AUTO DIFF WBC: CPT

## 2024-04-30 PROCEDURE — 84484 ASSAY OF TROPONIN QUANT: CPT

## 2024-04-30 PROCEDURE — 81025 URINE PREGNANCY TEST: CPT

## 2024-04-30 PROCEDURE — 81001 URINALYSIS AUTO W/SCOPE: CPT

## 2024-04-30 PROCEDURE — 84443 ASSAY THYROID STIM HORMONE: CPT

## 2024-04-30 RX ORDER — ONDANSETRON 2 MG/ML
4 INJECTION INTRAMUSCULAR; INTRAVENOUS ONCE AS NEEDED
Status: DISCONTINUED | OUTPATIENT
Start: 2024-04-30 | End: 2024-04-30 | Stop reason: HOSPADM

## 2024-04-30 RX ORDER — OMEPRAZOLE 20 MG/1
20 CAPSULE, DELAYED RELEASE ORAL DAILY
Qty: 30 CAPSULE | Refills: 0 | Status: SHIPPED | OUTPATIENT
Start: 2024-04-30

## 2024-04-30 RX ORDER — 0.9 % SODIUM CHLORIDE 0.9 %
1000 INTRAVENOUS SOLUTION INTRAVENOUS ONCE
Status: COMPLETED | OUTPATIENT
Start: 2024-04-30 | End: 2024-04-30

## 2024-04-30 RX ORDER — ONDANSETRON 4 MG/1
4 TABLET, ORALLY DISINTEGRATING ORAL 3 TIMES DAILY PRN
Qty: 8 TABLET | Refills: 0 | Status: SHIPPED | OUTPATIENT
Start: 2024-04-30

## 2024-04-30 RX ADMIN — SODIUM CHLORIDE 1000 ML: 9 INJECTION, SOLUTION INTRAVENOUS at 12:10

## 2024-04-30 RX ADMIN — SODIUM CHLORIDE 1000 ML: 9 INJECTION, SOLUTION INTRAVENOUS at 12:09

## 2024-04-30 ASSESSMENT — LIFESTYLE VARIABLES
HOW MANY STANDARD DRINKS CONTAINING ALCOHOL DO YOU HAVE ON A TYPICAL DAY: PATIENT DOES NOT DRINK
HOW OFTEN DO YOU HAVE A DRINK CONTAINING ALCOHOL: NEVER

## 2024-04-30 ASSESSMENT — HEART SCORE: ECG: NON-SPECIFC REPOLARIZATION DISTURBANCE/LBTB/PM

## 2024-04-30 ASSESSMENT — PAIN SCALES - GENERAL: PAINLEVEL_OUTOF10: 0

## 2024-04-30 ASSESSMENT — PAIN - FUNCTIONAL ASSESSMENT: PAIN_FUNCTIONAL_ASSESSMENT: 0-10

## 2024-04-30 NOTE — ED TRIAGE NOTES
Pt states she started with NVD Friday evening. States Saturday while having a BM she had a syncopal episode. States no improvement in symptoms since Friday

## 2024-04-30 NOTE — DISCHARGE INSTRUCTIONS
Collection Time: 04/30/24  1:09 PM    Specimen: Urine   Result Value Ref Range    Color, UA YELLOW/STRAW      Appearance HAZY (A) CLEAR      Specific Gravity, UA 1.015 1.003 - 1.030      pH, Urine 6.0 5.0 - 8.0      Protein, UA Negative NEG mg/dL    Glucose, UA Negative NEG mg/dL    Ketones, Urine Negative NEG mg/dL    Bilirubin Urine Negative NEG      Blood, Urine TRACE (A) NEG      Urobilinogen, Urine 0.2 0.2 - 1.0 EU/dL    Nitrite, Urine Negative NEG      Leukocyte Esterase, Urine SMALL (A) NEG      WBC, UA 10-20 0 - 4 /hpf    RBC, UA 0-5 /hpf    Epithelial Cells UA MODERATE (A) FEW /lpf    BACTERIA, URINE 1+ (A) NEG /hpf    Urine Culture if Indicated URINE CULTURE ORDERED     POC Pregnancy Urine Qual    Collection Time: 04/30/24  1:41 PM   Result Value Ref Range    Preg Test, Ur Negative NEG         Radiologic Studies  XR CHEST (2 VW)   Final Result      Normal PA and lateral chest views.

## 2024-04-30 NOTE — ED PROVIDER NOTES
consider anterolateral ischemia  Abnormal ECG  No previous ECGs available     CBC with Auto Differential    Collection Time: 04/30/24 11:19 AM   Result Value Ref Range    WBC 6.4 3.6 - 11.0 K/uL    RBC 5.05 3.80 - 5.20 M/uL    Hemoglobin 13.3 11.5 - 16.0 g/dL    Hematocrit 40.5 35.0 - 47.0 %    MCV 80.2 80.0 - 99.0 FL    MCH 26.3 26.0 - 34.0 PG    MCHC 32.8 30.0 - 36.5 g/dL    RDW 14.4 11.5 - 14.5 %    Platelets 250 150 - 400 K/uL    MPV 9.3 8.9 - 12.9 FL    Nucleated RBCs 0.0 0  WBC    nRBC 0.00 0.00 - 0.01 K/uL    Neutrophils % 62 32 - 75 %    Lymphocytes % 24 12 - 49 %    Monocytes % 11 5 - 13 %    Eosinophils % 2 (L) 7 %    Basophils % 0 0 - 1 %    Immature Granulocytes % 1 (H) 0 - 0.5 %    Neutrophils Absolute 4.0 1.8 - 8.0 K/UL    Lymphocytes Absolute 1.5 0.8 - 3.5 K/UL    Monocytes Absolute 0.7 0.0 - 1.0 K/UL    Eosinophils Absolute 0.1 0.0 - 0.4 K/UL    Basophils Absolute 0.0 0 - 1 K/UL    Immature Granulocytes Absolute 0.0 0.00 - 0.04 K/UL    Differential Type AUTOMATED     Comprehensive Metabolic Panel    Collection Time: 04/30/24 11:19 AM   Result Value Ref Range    Sodium 139 136 - 145 mmol/L    Potassium 3.7 3.5 - 5.1 mmol/L    Chloride 104 98 - 107 mmol/L    CO2 24 22 - 29 mmol/L    Anion Gap 11 5 - 15 mmol/L    Glucose 107 (H) 65 - 100 mg/dL    BUN 14 6 - 20 MG/DL    Creatinine 0.62 0.50 - 0.90 MG/DL    Bun/Cre Ratio 23 (H) 12 - 20      Est, Glom Filt Rate >90 >60 ml/min/1.73m2    Calcium 9.2 8.6 - 10.0 MG/DL    Total Bilirubin 0.3 0.2 - 1.0 MG/DL    ALT 19 10 - 35 U/L    AST 18 10 - 35 U/L    Alk Phosphatase 95 35 - 104 U/L    Total Protein 7.3 6.4 - 8.3 g/dL    Albumin 4.3 3.5 - 5.2 g/dL    Globulin 3.0 2.0 - 4.0 g/dL    Albumin/Globulin Ratio 1.4 1.1 - 2.2     Lipase    Collection Time: 04/30/24 11:19 AM   Result Value Ref Range    Lipase 25 13 - 60 U/L   Magnesium    Collection Time: 04/30/24 11:19 AM   Result Value Ref Range    Magnesium 1.9 1.6 - 2.6 mg/dL   TSH    Collection Time:

## 2024-05-01 LAB
BACTERIA SPEC CULT: NORMAL
EKG ATRIAL RATE: 83 BPM
EKG DIAGNOSIS: NORMAL
EKG P AXIS: 50 DEGREES
EKG P-R INTERVAL: 174 MS
EKG Q-T INTERVAL: 372 MS
EKG QRS DURATION: 82 MS
EKG QTC CALCULATION (BAZETT): 437 MS
EKG R AXIS: 48 DEGREES
EKG T AXIS: -28 DEGREES
EKG VENTRICULAR RATE: 83 BPM
SERVICE CMNT-IMP: NORMAL

## 2024-05-01 PROCEDURE — 93010 ELECTROCARDIOGRAM REPORT: CPT | Performed by: INTERNAL MEDICINE

## 2025-01-15 ENCOUNTER — APPOINTMENT (OUTPATIENT)
Facility: HOSPITAL | Age: 40
End: 2025-01-15
Payer: COMMERCIAL

## 2025-01-15 ENCOUNTER — HOSPITAL ENCOUNTER (EMERGENCY)
Facility: HOSPITAL | Age: 40
Discharge: HOME | End: 2025-01-15
Attending: EMERGENCY MEDICINE
Payer: COMMERCIAL

## 2025-01-15 VITALS
DIASTOLIC BLOOD PRESSURE: 80 MMHG | WEIGHT: 175 LBS | TEMPERATURE: 97.7 F | BODY MASS INDEX: 31.01 KG/M2 | SYSTOLIC BLOOD PRESSURE: 123 MMHG | RESPIRATION RATE: 17 BRPM | HEIGHT: 63 IN | OXYGEN SATURATION: 98 % | HEART RATE: 91 BPM

## 2025-01-15 DIAGNOSIS — M25.512 ACUTE PAIN OF LEFT SHOULDER: ICD-10-CM

## 2025-01-15 DIAGNOSIS — M54.50 ACUTE RIGHT-SIDED LOW BACK PAIN WITHOUT SCIATICA: ICD-10-CM

## 2025-01-15 DIAGNOSIS — M54.2 NECK PAIN: ICD-10-CM
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
## 2025-01-15 DIAGNOSIS — R07.81 RIB PAIN: ICD-10-CM

## 2025-01-15 PROCEDURE — 71111 X-RAY EXAM RIBS/CHEST4/> VWS: CPT

## 2025-01-15 PROCEDURE — 73030 X-RAY EXAM OF SHOULDER: CPT

## 2025-01-15 PROCEDURE — 99283 EMERGENCY DEPT VISIT LOW MDM: CPT

## 2025-01-15 PROCEDURE — 6370000000 HC RX 637 (ALT 250 FOR IP)

## 2025-01-15 RX ORDER — METHOCARBAMOL 500 MG/1
750 TABLET, FILM COATED ORAL
Status: COMPLETED | OUTPATIENT
Start: 2025-01-15 | End: 2025-01-15

## 2025-01-15 RX ORDER — METHOCARBAMOL 750 MG/1
750 TABLET, FILM COATED ORAL 4 TIMES DAILY
Qty: 20 TABLET | Refills: 0 | Status: SHIPPED | OUTPATIENT
Start: 2025-01-15 | End: 2025-01-20

## 2025-01-15 RX ORDER — ACETAMINOPHEN 500 MG
1000 TABLET ORAL
Status: COMPLETED | OUTPATIENT
Start: 2025-01-15 | End: 2025-01-15

## 2025-01-15 RX ADMIN — ACETAMINOPHEN 1000 MG: 500 TABLET ORAL at 14:38

## 2025-01-15 RX ADMIN — METHOCARBAMOL TABLETS 750 MG: 500 TABLET, COATED ORAL at 14:37

## 2025-01-15 ASSESSMENT — PAIN SCALES - GENERAL
PAINLEVEL_OUTOF10: 4
PAINLEVEL_OUTOF10: 2

## 2025-01-15 ASSESSMENT — PAIN - FUNCTIONAL ASSESSMENT: PAIN_FUNCTIONAL_ASSESSMENT: 0-10

## 2025-01-15 NOTE — ED NOTES
Patient does not appear to be in any acute distress/shows no evidence of clinical instability at this time.     Provider has reviewed discharge instructions with the patient/family.  The patient/family verbalized understanding instructions as well as need for follow up for any further symptoms.     Discharge papers given, education provided, and any questions answered.     
DISCHARGE
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
